# Patient Record
Sex: FEMALE | Race: WHITE | Employment: FULL TIME | URBAN - METROPOLITAN AREA
[De-identification: names, ages, dates, MRNs, and addresses within clinical notes are randomized per-mention and may not be internally consistent; named-entity substitution may affect disease eponyms.]

---

## 2019-08-23 DIAGNOSIS — F41.9 ANXIETY: ICD-10-CM

## 2019-08-23 RX ORDER — DEXTROAMPHETAMINE SACCHARATE, AMPHETAMINE ASPARTATE, DEXTROAMPHETAMINE SULFATE AND AMPHETAMINE SULFATE 2.5; 2.5; 2.5; 2.5 MG/1; MG/1; MG/1; MG/1
10 TABLET ORAL 3 TIMES DAILY
COMMUNITY
Start: 2019-08-14 | End: 2021-05-25 | Stop reason: ALTCHOICE

## 2019-08-23 RX ORDER — DEXTROAMPHETAMINE SACCHARATE, AMPHETAMINE ASPARTATE MONOHYDRATE, DEXTROAMPHETAMINE SULFATE AND AMPHETAMINE SULFATE 1.25; 1.25; 1.25; 1.25 MG/1; MG/1; MG/1; MG/1
10 CAPSULE, EXTENDED RELEASE ORAL
COMMUNITY
End: 2019-08-29

## 2019-08-23 RX ORDER — PAROXETINE HYDROCHLORIDE 20 MG/1
20 TABLET, FILM COATED ORAL EVERY MORNING
COMMUNITY
Start: 2018-12-03 | End: 2019-08-29

## 2019-08-23 RX ORDER — PAROXETINE 30 MG/1
30 TABLET, FILM COATED ORAL EVERY MORNING
COMMUNITY
End: 2020-11-19

## 2019-08-23 SDOH — HEALTH STABILITY: MENTAL HEALTH: HOW OFTEN DO YOU HAVE A DRINK CONTAINING ALCOHOL?: NEVER

## 2019-08-29 ENCOUNTER — OFFICE VISIT (OUTPATIENT)
Dept: ENDOCRINOLOGY | Age: 39
End: 2019-08-29
Payer: COMMERCIAL

## 2019-08-29 VITALS
WEIGHT: 165 LBS | BODY MASS INDEX: 28.17 KG/M2 | DIASTOLIC BLOOD PRESSURE: 95 MMHG | SYSTOLIC BLOOD PRESSURE: 128 MMHG | OXYGEN SATURATION: 99 % | HEART RATE: 84 BPM | HEIGHT: 64 IN | TEMPERATURE: 98.2 F

## 2019-08-29 DIAGNOSIS — D35.2 PITUITARY ADENOMA (HCC): ICD-10-CM

## 2019-08-29 DIAGNOSIS — R79.89 ABNORMAL THYROID BLOOD TEST: ICD-10-CM

## 2019-08-29 DIAGNOSIS — E22.1 HYPERPROLACTINEMIA (HCC): Primary | ICD-10-CM

## 2019-08-29 PROBLEM — Z86.69 HISTORY OF MIGRAINE: Status: ACTIVE | Noted: 2019-08-29

## 2019-08-29 PROBLEM — N85.8 FIBROSIS, UTERUS: Status: ACTIVE | Noted: 2019-08-20

## 2019-08-29 PROCEDURE — G8419 CALC BMI OUT NRM PARAM NOF/U: HCPCS | Performed by: INTERNAL MEDICINE

## 2019-08-29 PROCEDURE — G8427 DOCREV CUR MEDS BY ELIG CLIN: HCPCS | Performed by: INTERNAL MEDICINE

## 2019-08-29 PROCEDURE — 1036F TOBACCO NON-USER: CPT | Performed by: INTERNAL MEDICINE

## 2019-08-29 PROCEDURE — 99204 OFFICE O/P NEW MOD 45 MIN: CPT | Performed by: INTERNAL MEDICINE

## 2019-08-29 NOTE — PROGRESS NOTES
Patient ID:   Anatoly Walls is a 46 y.o. female    Chief Complaint:   Anatoly Walls is seen for initial evaluation of elevated prolactin levels. Subjective:     MRI Aug 2019: 3 mm nonenhancing nodular focus within the right half of the pituitary gland with associated focal contour abnormality, likely representing the clinically suspected prolactinoma. Last pregnancy     Prolactin 23.6, Aug 2019. She has breast discharge since giving birth in 2016. She has PMS symptoms of headaches. Mentsrual cycles are regular. S/p tubal ligation.    Denies Breast exam/Mammogram/ Nipple piercing     Denies drugs (heroine)   Denies family history of thyroid, parathyroid, pituitary, pancreatic tumors     The following portions of the patient's history were reviewed and updated as appropriate:        Family History   Problem Relation Age of Onset    Heart Failure Father          Social History     Socioeconomic History    Marital status:      Spouse name: Not on file    Number of children: Not on file    Years of education: Not on file    Highest education level: Not on file   Occupational History    Not on file   Social Needs    Financial resource strain: Not on file    Food insecurity:     Worry: Not on file     Inability: Not on file    Transportation needs:     Medical: Not on file     Non-medical: Not on file   Tobacco Use    Smoking status: Never Smoker    Smokeless tobacco: Never Used   Substance and Sexual Activity    Alcohol use: Never     Frequency: Never    Drug use: Never    Sexual activity: Not on file   Lifestyle    Physical activity:     Days per week: Not on file     Minutes per session: Not on file    Stress: Not on file   Relationships    Social connections:     Talks on phone: Not on file     Gets together: Not on file     Attends Hinduism service: Not on file     Active member of club or organization: Not on file     Attends meetings of clubs or organizations: Not on file Relationship status: Not on file    Intimate partner violence:     Fear of current or ex partner: Not on file     Emotionally abused: Not on file     Physically abused: Not on file     Forced sexual activity: Not on file   Other Topics Concern    Not on file   Social History Narrative    Not on file         Past Medical History:   Diagnosis Date    Anxiety     Migraines          Past Surgical History:   Procedure Laterality Date    APPENDECTOMY      TUBAL LIGATION     Past Surgical History:   Procedure Laterality Date    APPENDECTOMY      TUBAL LIGATION        Allergen Reactions    Penicillins Other (See Comments)     Other reaction(s): Headache  Has only taken in one time and got a headache           Current Outpatient Medications:     amphetamine-dextroamphetamine (ADDERALL) 10 MG tablet, Take 10 mg by mouth 3 times daily. , Disp: , Rfl:     Erenumab-aooe (AIMOVIG) 140 MG/ML SOAJ, Inject 140 mg into the skin, Disp: , Rfl:     PARoxetine (PAXIL) 30 MG tablet, Take 30 mg by mouth every morning , Disp: , Rfl:       Review of Systems:  Constitutional: Negative for fever, chills, and unexpected weight change. HENT: Negative for congestion, ear pain, rhinorrhea,  sore throat and trouble swallowing. Eyes: Negative for photophobia, redness, itching. Respiratory: Negative for cough, shortness of breath and sputum. Cardiovascular: Negative for chest pain, palpitations and leg swelling. Gastrointestinal: Negative for nausea, vomiting, abdominal pain, diarrhea, constipation. Endocrine: Negative for cold intolerance, heat intolerance, polydipsia, polyphagia and polyuria. Genitourinary: Negative for dysuria, urgency, frequency, hematuria and flank pain. Musculoskeletal: Negative for myalgias, back pain, arthralgias and neck pain. Skin/Nail: Negative for rash, itching. Normal nails. Neurological: Negative for seizures, weakness, light-headedness, numbness and headaches.    Hematological/ Lymph

## 2019-08-29 NOTE — PATIENT INSTRUCTIONS
Patient Education        Learning About a Prolactinoma  What is a prolactinoma? A prolactinoma is a tumor on the pituitary gland that makes too much of the hormone prolactin. This type of tumor is benign, which means it's not cancer. The pituitary (say \"xzv-BHM-ph-tair-ee\") gland--at the base of the brain--makes prolactin. After a woman is pregnant, this hormone causes the breasts to make milk. But a prolactinoma also makes prolactin. This means that your body can have too much of the hormone. It's not normal for women who aren't pregnant or nursing to have a high level of prolactin. For both men and women, too much of this hormone can make the breasts produce milk. It also can cause low sex drive and infertility. What are the symptoms? You might not have any symptoms from a prolactinoma. But in some men and women, their breasts may produce milk. In women, an increase in prolactin can lower the level of estrogen. That can cause infertility, menstrual changes, and less desire to have sex. In men, it can lower the level of testosterone. That can cause erection problems and less desire to have sex. The tumor may cause a headache. And sometimes the tumor presses on the optic nerves, which are near the pituitary gland. This might cause vision problems. How is it diagnosed? A blood test will show if you have too much prolactin in your blood. Your doctor also may do an MRI test. It can show if you have the tumor and how big it is. How is it treated? Sometimes, no treatment is needed. If you have symptoms, your doctor may treat you with dopamine agonists. This medicine can shrink the tumor. It also may bring the level of prolactin back to normal. You may need to take this medicine for 2 years or more. During and after treatment, you will get routine tests to check your hormone levels. In some cases, surgery is done to remove the tumor. This may happen if you can't take the medicine or it doesn't work.

## 2019-08-30 ENCOUNTER — HOSPITAL ENCOUNTER (OUTPATIENT)
Age: 39
Discharge: HOME | End: 2019-08-30
Payer: COMMERCIAL

## 2019-08-30 DIAGNOSIS — R79.89 ABNORMAL THYROID BLOOD TEST: ICD-10-CM

## 2019-08-30 DIAGNOSIS — D35.2 PITUITARY ADENOMA (HCC): ICD-10-CM

## 2019-08-30 DIAGNOSIS — E22.1 HYPERPROLACTINEMIA (HCC): ICD-10-CM

## 2019-08-30 LAB
A/G RATIO: 1.4 (ref 1.1–2.2)
ALBUMIN SERPL-MCNC: 4.2 G/DL (ref 3.4–5)
ALP BLD-CCNC: 58 U/L (ref 40–129)
ALT SERPL-CCNC: 9 U/L (ref 10–40)
ANION GAP SERPL CALCULATED.3IONS-SCNC: 12 MMOL/L (ref 3–16)
AST SERPL-CCNC: 17 U/L (ref 15–37)
BILIRUB SERPL-MCNC: 0.4 MG/DL (ref 0–1)
BUN BLDV-MCNC: 12 MG/DL (ref 7–20)
CALCIUM SERPL-MCNC: 9.3 MG/DL (ref 8.3–10.6)
CHLORIDE BLD-SCNC: 105 MMOL/L (ref 99–110)
CO2: 24 MMOL/L (ref 21–32)
CREAT SERPL-MCNC: 0.8 MG/DL (ref 0.6–1.1)
FOLLICLE STIMULATING HORMONE: 15.4 MIU/ML
GFR AFRICAN AMERICAN: >60
GFR NON-AFRICAN AMERICAN: >60
GLOBULIN: 2.9 G/DL
GLUCOSE BLD-MCNC: 94 MG/DL (ref 70–99)
LUTEINIZING HORMONE: 7.1 MIU/ML
POTASSIUM SERPL-SCNC: 4.5 MMOL/L (ref 3.5–5.1)
PROLACTIN: 18.5 NG/ML
SODIUM BLD-SCNC: 141 MMOL/L (ref 136–145)
T3 FREE: 2.6 PG/ML (ref 2.3–4.2)
T4 FREE: 0.8 NG/DL (ref 0.9–1.8)
TOTAL PROTEIN: 7.1 G/DL (ref 6.4–8.2)
TSH SERPL DL<=0.05 MIU/L-ACNC: 1.63 UIU/ML (ref 0.27–4.2)

## 2019-08-30 PROCEDURE — 84439 ASSAY OF FREE THYROXINE: CPT

## 2019-08-30 PROCEDURE — 84305 ASSAY OF SOMATOMEDIN: CPT

## 2019-08-30 PROCEDURE — 83002 ASSAY OF GONADOTROPIN (LH): CPT

## 2019-08-30 PROCEDURE — 84146 ASSAY OF PROLACTIN: CPT

## 2019-08-30 PROCEDURE — 83001 ASSAY OF GONADOTROPIN (FSH): CPT

## 2019-08-30 PROCEDURE — 36415 COLL VENOUS BLD VENIPUNCTURE: CPT

## 2019-08-30 PROCEDURE — 84481 FREE ASSAY (FT-3): CPT

## 2019-08-30 PROCEDURE — 84443 ASSAY THYROID STIM HORMONE: CPT

## 2019-08-30 PROCEDURE — 80053 COMPREHEN METABOLIC PANEL: CPT

## 2019-08-31 LAB — MISCELLANEOUS LAB TEST ORDER: NORMAL

## 2019-09-01 ENCOUNTER — HOSPITAL ENCOUNTER (OUTPATIENT)
Age: 39
Discharge: HOME | End: 2019-09-01
Payer: COMMERCIAL

## 2019-09-01 PROCEDURE — 82533 TOTAL CORTISOL: CPT

## 2019-09-02 ENCOUNTER — HOSPITAL ENCOUNTER (OUTPATIENT)
Age: 39
Discharge: HOME | End: 2019-09-02
Payer: COMMERCIAL

## 2019-09-02 LAB
IGF-1 (INSULIN-LIKE GROWTH I): 166 NG/ML (ref 79–276)
INSULIN-LIKE GROWTH FACTOR-1 Z-SCORE: 0.3

## 2019-09-02 PROCEDURE — 82533 TOTAL CORTISOL: CPT

## 2019-09-04 ENCOUNTER — HOSPITAL ENCOUNTER (OUTPATIENT)
Age: 39
Discharge: HOME | End: 2019-09-04
Payer: COMMERCIAL

## 2019-09-04 PROCEDURE — 82533 TOTAL CORTISOL: CPT

## 2019-09-05 ENCOUNTER — TELEPHONE (OUTPATIENT)
Dept: ENDOCRINOLOGY | Age: 39
End: 2019-09-05

## 2019-09-05 DIAGNOSIS — E03.8 CENTRAL HYPOTHYROIDISM: ICD-10-CM

## 2019-09-05 DIAGNOSIS — E22.1 HYPERPROLACTINEMIA (HCC): Primary | ICD-10-CM

## 2019-09-06 LAB
CORTISOL SALIVARY: 0.08 UG/DL
CORTISOL SALIVARY: 0.14 UG/DL
CORTISOL SALIVARY: 0.15 UG/DL

## 2019-09-09 ENCOUNTER — TELEPHONE (OUTPATIENT)
Dept: ENDOCRINOLOGY | Age: 39
End: 2019-09-09

## 2019-09-09 RX ORDER — LEVOTHYROXINE SODIUM 0.03 MG/1
25 TABLET ORAL DAILY
Qty: 90 TABLET | Refills: 0 | Status: SHIPPED | OUTPATIENT
Start: 2019-09-09 | End: 2019-11-21 | Stop reason: SDUPTHER

## 2019-11-11 ENCOUNTER — HOSPITAL ENCOUNTER (OUTPATIENT)
Age: 39
Discharge: HOME | End: 2019-11-11
Payer: COMMERCIAL

## 2019-11-11 DIAGNOSIS — E03.8 CENTRAL HYPOTHYROIDISM: ICD-10-CM

## 2019-11-11 DIAGNOSIS — E22.1 HYPERPROLACTINEMIA (HCC): ICD-10-CM

## 2019-11-11 LAB
PROLACTIN: 10.3 NG/ML
T3 FREE: 2.5 PG/ML (ref 2.3–4.2)
T4 FREE: 1 NG/DL (ref 0.9–1.8)
TSH SERPL DL<=0.05 MIU/L-ACNC: 0.86 UIU/ML (ref 0.27–4.2)

## 2019-11-11 PROCEDURE — 84146 ASSAY OF PROLACTIN: CPT

## 2019-11-11 PROCEDURE — 84443 ASSAY THYROID STIM HORMONE: CPT

## 2019-11-11 PROCEDURE — 84481 FREE ASSAY (FT-3): CPT

## 2019-11-11 PROCEDURE — 36415 COLL VENOUS BLD VENIPUNCTURE: CPT

## 2019-11-11 PROCEDURE — 84439 ASSAY OF FREE THYROXINE: CPT

## 2019-11-21 ENCOUNTER — OFFICE VISIT (OUTPATIENT)
Dept: ENDOCRINOLOGY | Age: 39
End: 2019-11-21
Payer: COMMERCIAL

## 2019-11-21 VITALS
SYSTOLIC BLOOD PRESSURE: 140 MMHG | DIASTOLIC BLOOD PRESSURE: 99 MMHG | WEIGHT: 171.8 LBS | HEART RATE: 88 BPM | HEIGHT: 64 IN | BODY MASS INDEX: 29.33 KG/M2 | OXYGEN SATURATION: 99 %

## 2019-11-21 DIAGNOSIS — E03.8 CENTRAL HYPOTHYROIDISM: ICD-10-CM

## 2019-11-21 DIAGNOSIS — E22.1 HYPERPROLACTINEMIA (HCC): ICD-10-CM

## 2019-11-21 DIAGNOSIS — D35.2 PITUITARY MICROADENOMA (HCC): Primary | ICD-10-CM

## 2019-11-21 PROCEDURE — G8427 DOCREV CUR MEDS BY ELIG CLIN: HCPCS | Performed by: INTERNAL MEDICINE

## 2019-11-21 PROCEDURE — 1036F TOBACCO NON-USER: CPT | Performed by: INTERNAL MEDICINE

## 2019-11-21 PROCEDURE — G8484 FLU IMMUNIZE NO ADMIN: HCPCS | Performed by: INTERNAL MEDICINE

## 2019-11-21 PROCEDURE — 99214 OFFICE O/P EST MOD 30 MIN: CPT | Performed by: INTERNAL MEDICINE

## 2019-11-21 PROCEDURE — G8419 CALC BMI OUT NRM PARAM NOF/U: HCPCS | Performed by: INTERNAL MEDICINE

## 2019-11-21 RX ORDER — LEVOTHYROXINE SODIUM 0.03 MG/1
25 TABLET ORAL DAILY
Qty: 90 TABLET | Refills: 1 | Status: SHIPPED | OUTPATIENT
Start: 2019-11-21 | End: 2020-03-05 | Stop reason: SDUPTHER

## 2019-11-25 ENCOUNTER — TELEPHONE (OUTPATIENT)
Dept: ENDOCRINOLOGY | Age: 39
End: 2019-11-25

## 2019-11-25 DIAGNOSIS — E22.1 HYPERPROLACTINEMIA (HCC): Primary | ICD-10-CM

## 2019-11-27 ENCOUNTER — HOSPITAL ENCOUNTER (OUTPATIENT)
Dept: ULTRASOUND IMAGING | Age: 39
Discharge: HOME | End: 2019-11-27
Payer: COMMERCIAL

## 2019-11-27 ENCOUNTER — HOSPITAL ENCOUNTER (OUTPATIENT)
Dept: WOMENS IMAGING | Age: 39
Discharge: HOME | End: 2019-11-27
Payer: COMMERCIAL

## 2019-11-27 DIAGNOSIS — R92.8 ABNORMAL MAMMOGRAM: ICD-10-CM

## 2019-11-27 DIAGNOSIS — D35.2 PITUITARY MICROADENOMA (HCC): ICD-10-CM

## 2019-11-27 DIAGNOSIS — E22.1 HYPERPROLACTINEMIA (HCC): ICD-10-CM

## 2019-11-27 DIAGNOSIS — E03.8 CENTRAL HYPOTHYROIDISM: ICD-10-CM

## 2019-11-27 PROCEDURE — 76642 ULTRASOUND BREAST LIMITED: CPT

## 2019-11-27 PROCEDURE — G0279 TOMOSYNTHESIS, MAMMO: HCPCS

## 2020-03-02 ENCOUNTER — HOSPITAL ENCOUNTER (OUTPATIENT)
Age: 40
Discharge: HOME | End: 2020-03-02
Payer: COMMERCIAL

## 2020-03-02 LAB
A/G RATIO: 1.2 (ref 1.1–2.2)
ALBUMIN SERPL-MCNC: 3.9 G/DL (ref 3.4–5)
ALP BLD-CCNC: 61 U/L (ref 40–129)
ALT SERPL-CCNC: 10 U/L (ref 10–40)
ANION GAP SERPL CALCULATED.3IONS-SCNC: 11 MMOL/L (ref 3–16)
AST SERPL-CCNC: 17 U/L (ref 15–37)
BILIRUB SERPL-MCNC: 0.4 MG/DL (ref 0–1)
BUN BLDV-MCNC: 10 MG/DL (ref 7–20)
CALCIUM SERPL-MCNC: 9.1 MG/DL (ref 8.3–10.6)
CHLORIDE BLD-SCNC: 102 MMOL/L (ref 99–110)
CO2: 25 MMOL/L (ref 21–32)
CREAT SERPL-MCNC: 0.7 MG/DL (ref 0.6–1.1)
GFR AFRICAN AMERICAN: >60
GFR NON-AFRICAN AMERICAN: >60
GLOBULIN: 3.2 G/DL
GLUCOSE BLD-MCNC: 93 MG/DL (ref 70–99)
POTASSIUM SERPL-SCNC: 4.7 MMOL/L (ref 3.5–5.1)
PROLACTIN: 15 NG/ML
SODIUM BLD-SCNC: 138 MMOL/L (ref 136–145)
T3 FREE: 2.4 PG/ML (ref 2.3–4.2)
T4 FREE: 0.9 NG/DL (ref 0.9–1.8)
TOTAL PROTEIN: 7.1 G/DL (ref 6.4–8.2)
TSH SERPL DL<=0.05 MIU/L-ACNC: 0.97 UIU/ML (ref 0.27–4.2)

## 2020-03-02 PROCEDURE — 84443 ASSAY THYROID STIM HORMONE: CPT

## 2020-03-02 PROCEDURE — 84146 ASSAY OF PROLACTIN: CPT

## 2020-03-02 PROCEDURE — 84481 FREE ASSAY (FT-3): CPT

## 2020-03-02 PROCEDURE — 36415 COLL VENOUS BLD VENIPUNCTURE: CPT

## 2020-03-02 PROCEDURE — 84439 ASSAY OF FREE THYROXINE: CPT

## 2020-03-02 PROCEDURE — 80053 COMPREHEN METABOLIC PANEL: CPT

## 2020-03-05 ENCOUNTER — OFFICE VISIT (OUTPATIENT)
Dept: ENDOCRINOLOGY | Age: 40
End: 2020-03-05
Payer: COMMERCIAL

## 2020-03-05 VITALS
HEIGHT: 64 IN | HEART RATE: 94 BPM | SYSTOLIC BLOOD PRESSURE: 135 MMHG | WEIGHT: 174 LBS | DIASTOLIC BLOOD PRESSURE: 99 MMHG | BODY MASS INDEX: 29.71 KG/M2 | OXYGEN SATURATION: 100 %

## 2020-03-05 PROBLEM — E03.8 CENTRAL HYPOTHYROIDISM: Status: ACTIVE | Noted: 2020-03-05

## 2020-03-05 PROCEDURE — 99214 OFFICE O/P EST MOD 30 MIN: CPT | Performed by: INTERNAL MEDICINE

## 2020-03-05 PROCEDURE — 1036F TOBACCO NON-USER: CPT | Performed by: INTERNAL MEDICINE

## 2020-03-05 PROCEDURE — G8484 FLU IMMUNIZE NO ADMIN: HCPCS | Performed by: INTERNAL MEDICINE

## 2020-03-05 PROCEDURE — G8427 DOCREV CUR MEDS BY ELIG CLIN: HCPCS | Performed by: INTERNAL MEDICINE

## 2020-03-05 PROCEDURE — G8419 CALC BMI OUT NRM PARAM NOF/U: HCPCS | Performed by: INTERNAL MEDICINE

## 2020-03-05 RX ORDER — LEVOTHYROXINE SODIUM 0.05 MG/1
50 TABLET ORAL DAILY
Qty: 90 TABLET | Refills: 0 | Status: SHIPPED | OUTPATIENT
Start: 2020-03-05 | End: 2020-05-07 | Stop reason: SDUPTHER

## 2020-03-05 RX ORDER — HYDROCHLOROTHIAZIDE 12.5 MG/1
12.5 TABLET ORAL DAILY
Qty: 90 TABLET | Refills: 0 | Status: SHIPPED | OUTPATIENT
Start: 2020-03-05 | End: 2020-05-07 | Stop reason: SDUPTHER

## 2020-03-05 NOTE — PROGRESS NOTES
Patient ID: Sandi Ruiz is a 45 y.o. female    Chief Complaint: Hyperprolactinemia, pituitary microadenoma, central hypothyroidism     HPI:   Sandi Ruiz is here for an evaluation of hyperprolactinemia. MRI Aug 2019: 3 mm nonenhancing nodular focus within the right half of the pituitary gland with associated focal contour abnormality, likely representing the clinically suspected prolactinoma. Last pregnancy      Prolactin 23.6, Aug 2019. Prolactin 18, at [facility] 11 Aug 2019. Other work up was okay. Prolactin 15 Nov 2019.      She has breast discharge since giving birth in 2016. She still has breast discharge  Materna GM had breast cancer. She has PMS symptoms of headaches. Mentsrual cycles are regular. S/p tubal ligation. Denies Breast exam/Mammogram/ Nipple piercing      Denies drugs (heroine)   Denies family history of thyroid, parathyroid, pituitary, pancreatic tumors     Central hypothyroidism:    Takes Levothyroxine 25 mcg (one a day on weekdays and two weekends (Saturday and Sunday), in morning empty stomach with water and waits for 30 minutes.      Energy levels good   Weight gain of 3 lbs   No Hair or skin changes   She has temperature sensitivity    She is a little anxious   Denies palpitations, sleep issues (on sleeping aid)    Neck pain    Family history of thyroid disorder: Not known   No neck radiation      The following portions of the patient's history were reviewed and updated as appropriate:       Family History   Problem Relation Age of Onset    Heart Failure Father             Social History     Socioeconomic History    Marital status:      Spouse name: Not on file    Number of children: Not on file    Years of education: Not on file    Highest education level: Not on file   Occupational History    Not on file   Social Needs    Financial resource strain: Not on file    Food insecurity:     Worry: Not on file     Inability: Not on file    Transportation needs: Medical: Not on file     Non-medical: Not on file   Tobacco Use    Smoking status: Never Smoker    Smokeless tobacco: Never Used   Substance and Sexual Activity    Alcohol use: Never     Frequency: Never    Drug use: Never    Sexual activity: Not on file   Lifestyle    Physical activity:     Days per week: Not on file     Minutes per session: Not on file    Stress: Not on file   Relationships    Social connections:     Talks on phone: Not on file     Gets together: Not on file     Attends Rastafari service: Not on file     Active member of club or organization: Not on file     Attends meetings of clubs or organizations: Not on file     Relationship status: Not on file    Intimate partner violence:     Fear of current or ex partner: Not on file     Emotionally abused: Not on file     Physically abused: Not on file     Forced sexual activity: Not on file   Other Topics Concern    Not on file   Social History Narrative    Not on file           Past Medical History:   Diagnosis Date    Anxiety     Migraines          Past Surgical History:   Procedure Laterality Date    APPENDECTOMY      TUBAL LIGATION             Allergies   Allergen Reactions    Penicillins Other (See Comments)     Other reaction(s): Headache  Has only taken in one time and got a headache             Current Outpatient Medications:     levothyroxine (SYNTHROID) 50 MCG tablet, Take 1 tablet by mouth daily, Disp: 90 tablet, Rfl: 0    hydroCHLOROthiazide (HYDRODIURIL) 12.5 MG tablet, Take 1 tablet by mouth daily, Disp: 90 tablet, Rfl: 0    amphetamine-dextroamphetamine (ADDERALL) 10 MG tablet, Take 10 mg by mouth 3 times daily. , Disp: , Rfl:     PARoxetine (PAXIL) 30 MG tablet, Take 30 mg by mouth every morning , Disp: , Rfl:       Review of Systems:  Constitutional: Negative for fever, chills. HENT: Negative for congestion, ear pain, rhinorrhea,  sore throat and trouble swallowing.    Eyes: Negative for photophobia, redness, itching. Respiratory: Negative for cough, shortness of breath and sputum. Cardiovascular: Negative for chest pain and leg swelling. Gastrointestinal: Negative for nausea, vomiting, abdominal pain, diarrhea, constipation. Endocrine: Negative for polydipsia, polyphagia and polyuria. Genitourinary: Negative for dysuria, urgency, frequency, hematuria and flank pain. Musculoskeletal: Negative for myalgias, back pain, arthralgias. Skin/Nail: Negative for rash, itching. Normal nails. Neurological: Negative for seizures, light-headedness, numbness and headaches. Hematological/ Lymph nodes: Negative for adenopathy. Does not bruise/bleed easily. Psychiatric/Behavioral: Negative for suicidal ideas and decreased concentration. Physical Exam:  BP (!) 136/95 (Site: Left Upper Arm, Position: Sitting, Cuff Size: Medium Adult)   Pulse 94   Ht 5' 4\" (1.626 m)   Wt 174 lb (78.9 kg)   LMP 02/05/2020   SpO2 100%   BMI 29.87 kg/m²   Constitutional: Patient is oriented to person, place, and time. Patient appears well-developed and well-nourished. HENT:    Head: Normocephalic and atraumatic. Eyes: Conjunctivae and EOM are normal.     Neck: Normal range of motion. Thyroid normal.   Cardiovascular: Normal rate, regular rhythm and normal heart sounds. Pulmonary/Chest: Effort normal and breath sounds normal.   Abdominal: Soft. Bowel sounds are normal.   Musculoskeletal: Normal range of motion. Neurological: Patient is alert and oriented to person, place, and time. Patient has normal reflexes. No fine tremors of hands. Skin: Skin is warm and dry. Psychiatric: Patient has a normal mood and affect.  Patient behavior is normal.     Lab Review:    Hospital Outpatient Visit on 03/02/2020   Component Date Value Ref Range Status    Prolactin 03/02/2020 15.0  ng/mL Final    T3, Free 03/02/2020 2.4  2.3 - 4.2 pg/mL Final    T4 Free 03/02/2020 0.9  0.9 - 1.8 ng/dL Final    TSH 03/02/2020 0.97  0.27 - 08/30/2019 2.6  2.3 - 4.2 pg/mL Final    Sodium 08/30/2019 141  136 - 145 mmol/L Final    Potassium 08/30/2019 4.5  3.5 - 5.1 mmol/L Final    Chloride 08/30/2019 105  99 - 110 mmol/L Final    CO2 08/30/2019 24  21 - 32 mmol/L Final    Anion Gap 08/30/2019 12  3 - 16 Final    Glucose 08/30/2019 94  70 - 99 mg/dL Final    BUN 08/30/2019 12  7 - 20 mg/dL Final    CREATININE 08/30/2019 0.8  0.6 - 1.1 mg/dL Final    GFR Non- 08/30/2019 >60  >60 Final    GFR  08/30/2019 >60  >60 Final    Calcium 08/30/2019 9.3  8.3 - 10.6 mg/dL Final    Total Protein 08/30/2019 7.1  6.4 - 8.2 g/dL Final    Alb 08/30/2019 4.2  3.4 - 5.0 g/dL Final    Albumin/Globulin Ratio 08/30/2019 1.4  1.1 - 2.2 Final    Total Bilirubin 08/30/2019 0.4  0.0 - 1.0 mg/dL Final    Alkaline Phosphatase 08/30/2019 58  40 - 129 U/L Final    ALT 08/30/2019 9* 10 - 40 U/L Final    AST 08/30/2019 17  15 - 37 U/L Final    Globulin 08/30/2019 2.9  g/dL Final    IGF-1 (INSULIN-LIKE GROWTH I) 08/30/2019 166  79 - 276 ng/mL Final    Insulin-Like GF-1 Z-Score 08/30/2019 0.3   Final    Misc Test Order 08/30/2019 SEE NOTE   Final        No results found. Assessment/Plan:     Toño Kebede was seen today for follow-up. Diagnoses and all orders for this visit:    Pituitary adenoma ([address]  -     MRI [facility]; Future    Hyperprolactinemia (HCC)  -     levothyroxine (SYNTHROID) 50 MCG tablet; Take 1 tablet by mouth daily  -     Comprehensive Metabolic Panel; Future    Central hypothyroidism  -     levothyroxine (SYNTHROID) 50 MCG tablet; Take 1 tablet by mouth daily  -     Comprehensive Metabolic Panel; Future  -     TSH without Reflex; Future  -     T4, Free; Future  -     T3, Free; Future    Essential hypertension  -     hydroCHLOROthiazide (HYDRODIURIL) 12.5 MG tablet; Take 1 tablet by mouth daily  -     Comprehensive Metabolic Panel;  Future        1: Pituitary adenoma   Hyperprolactinemia

## 2020-04-14 ENCOUNTER — TELEPHONE (OUTPATIENT)
Dept: ENDOCRINOLOGY | Age: 40
End: 2020-04-14

## 2020-04-30 ENCOUNTER — HOSPITAL ENCOUNTER (OUTPATIENT)
Age: 40
Discharge: HOME | End: 2020-04-30
Payer: COMMERCIAL

## 2020-04-30 LAB
A/G RATIO: 1.3 (ref 1.1–2.2)
ALBUMIN SERPL-MCNC: 4.3 G/DL (ref 3.4–5)
ALP BLD-CCNC: 59 U/L (ref 40–129)
ALT SERPL-CCNC: 14 U/L (ref 10–40)
ANION GAP SERPL CALCULATED.3IONS-SCNC: 12 MMOL/L (ref 3–16)
AST SERPL-CCNC: 21 U/L (ref 15–37)
BILIRUB SERPL-MCNC: 0.7 MG/DL (ref 0–1)
BUN BLDV-MCNC: 10 MG/DL (ref 7–20)
CALCIUM SERPL-MCNC: 9.5 MG/DL (ref 8.3–10.6)
CHLORIDE BLD-SCNC: 101 MMOL/L (ref 99–110)
CO2: 27 MMOL/L (ref 21–32)
CREAT SERPL-MCNC: 0.7 MG/DL (ref 0.6–1.1)
GFR AFRICAN AMERICAN: >60
GFR NON-AFRICAN AMERICAN: >60
GLOBULIN: 3.2 G/DL
GLUCOSE BLD-MCNC: 86 MG/DL (ref 70–99)
POTASSIUM SERPL-SCNC: 4.7 MMOL/L (ref 3.5–5.1)
SODIUM BLD-SCNC: 140 MMOL/L (ref 136–145)
T3 FREE: 2.6 PG/ML (ref 2.3–4.2)
T4 FREE: 1.1 NG/DL (ref 0.9–1.8)
TOTAL PROTEIN: 7.5 G/DL (ref 6.4–8.2)
TSH SERPL DL<=0.05 MIU/L-ACNC: 0.62 UIU/ML (ref 0.27–4.2)

## 2020-04-30 PROCEDURE — 84439 ASSAY OF FREE THYROXINE: CPT

## 2020-04-30 PROCEDURE — 36415 COLL VENOUS BLD VENIPUNCTURE: CPT

## 2020-04-30 PROCEDURE — 84443 ASSAY THYROID STIM HORMONE: CPT

## 2020-04-30 PROCEDURE — 84481 FREE ASSAY (FT-3): CPT

## 2020-04-30 PROCEDURE — 80053 COMPREHEN METABOLIC PANEL: CPT

## 2020-05-07 ENCOUNTER — VIRTUAL VISIT (OUTPATIENT)
Dept: ENDOCRINOLOGY | Age: 40
End: 2020-05-07
Payer: COMMERCIAL

## 2020-05-07 PROCEDURE — 99214 OFFICE O/P EST MOD 30 MIN: CPT | Performed by: INTERNAL MEDICINE

## 2020-05-07 PROCEDURE — G8427 DOCREV CUR MEDS BY ELIG CLIN: HCPCS | Performed by: INTERNAL MEDICINE

## 2020-05-07 RX ORDER — LEVOTHYROXINE SODIUM 0.05 MG/1
50 TABLET ORAL DAILY
Qty: 90 TABLET | Refills: 1 | Status: SHIPPED | OUTPATIENT
Start: 2020-05-07 | End: 2020-11-19 | Stop reason: SDUPTHER

## 2020-05-07 RX ORDER — HYDROCHLOROTHIAZIDE 12.5 MG/1
12.5 TABLET ORAL DAILY
Qty: 90 TABLET | Refills: 1 | Status: SHIPPED | OUTPATIENT
Start: 2020-05-07 | End: 2020-11-19 | Stop reason: SDUPTHER

## 2020-05-07 NOTE — PROGRESS NOTES
Patient ID: Shani Galvan is a 45 y.o. female    Chief Complaint: Hyperprolactinemia, pituitary microadenoma, central hypothyroidism     Pursuant to the emergency declaration under the [facility], [facility] waiver authority and the [facility] Act this visit was conducted after obtaining verbal consent, with the use of [employer] interactive audio and video telecommunications system that permits real time communication between the patient and provider to substitute for an in-person clinic visit to reduce the patient's risk of exposure to COVID-19 and provide necessary medical care. Because this was a Telehealth visit, evaluation of the following organ systems was limited: Vitals, Constitutional, EENT, Resp, CV, GI, , MS, Neuro, Skin. Heme. Lymph, Imm. Shani Galvan was at home. Provider was at home. No one else was involved. The patient has also been advised to contact this office for worsening conditions or problems, and seek emergency medical treatment and/or call 911 if deemed necessary. HPI:   Shani Galvan is here for an evaluation of hyperprolactinemia. MRI Aug 2019: 3 mm nonenhancing nodular focus within the right half of the pituitary gland with associated focal contour abnormality, likely representing the clinically suspected prolactinoma. Last pregnancy      Prolactin 23.6, Aug 2019. Prolactin 18, at [facility] 11 Aug 2019. Other work up was okay. Prolactin 15 Nov 2019.      She has breast discharge since giving birth in 2016. She still has breast discharge  Materna GM had breast cancer. She has PMS symptoms of headaches. Mentsrual cycles are regular. S/p tubal ligation.    Denies Breast exam/Mammogram/ Nipple piercing      Denies drugs (heroine)   Denies family history of thyroid, parathyroid, pituitary, pancreatic tumors     Central hypothyroidism:    Takes Levothyroxine 50 mcg daily in morning place, and time. Psychiatric: Patient has a normal mood and affect.  Patient behavior is normal.     Lab Review:    Hospital Outpatient Visit on 04/30/2020   Component Date Value Ref Range Status    Sodium 04/30/2020 140  136 - 145 mmol/L Final    Potassium 04/30/2020 4.7  3.5 - 5.1 mmol/L Final    Chloride 04/30/2020 101  99 - 110 mmol/L Final    CO2 04/30/2020 27  21 - 32 mmol/L Final    Anion Gap 04/30/2020 12  3 - 16 Final    Glucose 04/30/2020 86  70 - 99 mg/dL Final    BUN 04/30/2020 10  7 - 20 mg/dL Final    CREATININE 04/30/2020 0.7  0.6 - 1.1 mg/dL Final    GFR Non- 04/30/2020 >60  >60 Final    GFR  04/30/2020 >60  >60 Final    Calcium 04/30/2020 9.5  8.3 - 10.6 mg/dL Final    Total Protein 04/30/2020 7.5  6.4 - 8.2 g/dL Final    Alb 04/30/2020 4.3  3.4 - 5.0 g/dL Final    Albumin/Globulin Ratio 04/30/2020 1.3  1.1 - 2.2 Final    Total Bilirubin 04/30/2020 0.7  0.0 - 1.0 mg/dL Final    Alkaline Phosphatase 04/30/2020 59  40 - 129 U/L Final    ALT 04/30/2020 14  10 - 40 U/L Final    AST 04/30/2020 21  15 - 37 U/L Final    Globulin 04/30/2020 3.2  g/dL Final    TSH 04/30/2020 0.62  0.27 - 4.20 uIU/mL Final    T4 Free 04/30/2020 1.1  0.9 - 1.8 ng/dL Final    T3, Free 04/30/2020 2.6  2.3 - 4.2 pg/mL Final   Hospital Outpatient Visit on 03/02/2020   Component Date Value Ref Range Status    Prolactin 03/02/2020 15.0  ng/mL Final    T3, Free 03/02/2020 2.4  2.3 - 4.2 pg/mL Final    T4 Free 03/02/2020 0.9  0.9 - 1.8 ng/dL Final    TSH 03/02/2020 0.97  0.27 - 4.20 uIU/mL Final    Sodium 03/02/2020 138  136 - 145 mmol/L Final    Potassium 03/02/2020 4.7  3.5 - 5.1 mmol/L Final    Chloride 03/02/2020 102  99 - 110 mmol/L Final    CO2 03/02/2020 25  21 - 32 mmol/L Final    Anion Gap 03/02/2020 11  3 - 16 Final    Glucose 03/02/2020 93  70 - 99 mg/dL Final    BUN 03/02/2020 10  7 - 20 mg/dL Final    CREATININE 03/02/2020 0.7  0.6 - 1.1 mg/dL Final    GFR Non- 03/02/2020 >60  >60 Final    GFR  03/02/2020 >60  >60 Final    Calcium 03/02/2020 9.1  8.3 - 10.6 mg/dL Final    Total Protein 03/02/2020 7.1  6.4 - 8.2 g/dL Final    Alb 03/02/2020 3.9  3.4 - 5.0 g/dL Final    Albumin/Globulin Ratio 03/02/2020 1.2  1.1 - 2.2 Final    Total Bilirubin 03/02/2020 0.4  0.0 - 1.0 mg/dL Final    Alkaline Phosphatase 03/02/2020 61  40 - 129 U/L Final    ALT 03/02/2020 10  10 - 40 U/L Final    AST 03/02/2020 17  15 - 37 U/L Final    Globulin 03/02/2020 3.2  g/dL Final   Hospital Outpatient Visit on 11/11/2019   Component Date Value Ref Range Status    Prolactin 11/11/2019 10.3  ng/mL Final    T3, Free 11/11/2019 2.5  2.3 - 4.2 pg/mL Final    T4 Free 11/11/2019 1.0  0.9 - 1.8 ng/dL Final    TSH 11/11/2019 0.86  0.27 - 4.20 uIU/mL Final   Hospital Outpatient Visit on 09/04/2019   Component Date Value Ref Range Status    Cortisol, Saliva 09/04/2019 0.145  ug/dL Final   Hospital Outpatient Visit on 09/02/2019   Component Date Value Ref Range Status    Cortisol, Saliva 09/02/2019 0.081  ug/dL Final   Hospital Outpatient Visit on 09/01/2019   Component Date Value Ref Range Status    Cortisol, Saliva 09/01/2019 0.151  ug/dL Final   Hospital Outpatient Visit on 08/30/2019   Component Date Value Ref Range Status    Prolactin 08/30/2019 18.5  ng/mL Final    FSH 08/30/2019 15.4  mIU/mL Final    LH 08/30/2019 7.1  mIU/mL Final    TSH 08/30/2019 1.63  0.27 - 4.20 uIU/mL Final    T4 Free 08/30/2019 0.8* 0.9 - 1.8 ng/dL Final    T3, Free 08/30/2019 2.6  2.3 - 4.2 pg/mL Final    Sodium 08/30/2019 141  136 - 145 mmol/L Final    Potassium 08/30/2019 4.5  3.5 - 5.1 mmol/L Final    Chloride 08/30/2019 105  99 - 110 mmol/L Final    CO2 08/30/2019 24  21 - 32 mmol/L Final    Anion Gap 08/30/2019 12  3 - 16 Final    Glucose 08/30/2019 94  70 - 99 mg/dL Final    BUN 08/30/2019 12  7 - 20 mg/dL Final    CREATININE 08/30/2019 0.8  0.6 - 1.1 mg/dL Final    GFR Non- 08/30/2019 >60  >60 Final    GFR  08/30/2019 >60  >60 Final    Calcium 08/30/2019 9.3  8.3 - 10.6 mg/dL Final    Total Protein 08/30/2019 7.1  6.4 - 8.2 g/dL Final    Alb 08/30/2019 4.2  3.4 - 5.0 g/dL Final    Albumin/Globulin Ratio 08/30/2019 1.4  1.1 - 2.2 Final    Total Bilirubin 08/30/2019 0.4  0.0 - 1.0 mg/dL Final    Alkaline Phosphatase 08/30/2019 58  40 - 129 U/L Final    ALT 08/30/2019 9* 10 - 40 U/L Final    AST 08/30/2019 17  15 - 37 U/L Final    Globulin 08/30/2019 2.9  g/dL Final    IGF-1 (INSULIN-LIKE GROWTH I) 08/30/2019 166  79 - 276 ng/mL Final    Insulin-Like GF-1 Z-Score 08/30/2019 0.3   Final    Misc Test Order 08/30/2019 SEE NOTE   Final        No results found. Assessment/Plan:     Diagnoses and all orders for this visit:    Pituitary adenoma [address] [address]    Hyperprolactinemia ([facility] [address]  -     levothyroxine (SYNTHROID) 50 MCG tablet; Take 1 tablet by mouth daily    Central hypothyroidism  -     levothyroxine (SYNTHROID) 50 MCG tablet; Take 1 tablet by mouth daily  -     TSH without Reflex; Future  -     T4, Free; Future  -     T3, Free; Future    Essential hypertension  -     hydrochlorothiazide (HYDRODIURIL) 12.5 MG tablet; Take 1 tablet by mouth daily        1: Pituitary adenoma   Hyperprolactinemia      Prolactin - Nov 2019   Prolactin - 15 March 2020     Secondary hormonal work up normal - aug 2019     MRI brain: April 2020   1. The previous hypoenhancing abnormality in the right posterior pituitary gland is no longer clearly visualized. No suspicious pituitary abnormality identified. 2. Minimal punctate nonspecific white matter signal abnormality is most consistent with minimal small vessel ischemic disease or minimal age related degenerative change.  No change in comparison to previous exam.  3. No acute intracranial abnormality.      No need to repeat MRI unless clinically indicated     2: Galactorrhea   Fam h/o breast cancer

## 2020-05-07 NOTE — PATIENT INSTRUCTIONS
Patient Education        DASH Diet: Care Instructions  Your Care Instructions    The DASH diet is an eating plan that can help lower your blood pressure. DASH stands for Dietary Approaches to Stop Hypertension. Hypertension is high blood pressure. The DASH diet focuses on eating foods that are high in calcium, potassium, and magnesium. These nutrients can lower blood pressure. The foods that are highest in these nutrients are fruits, vegetables, low-fat dairy products, nuts, seeds, and legumes. But taking calcium, potassium, and magnesium supplements instead of eating foods that are high in those nutrients does not have the same effect. The DASH diet also includes whole grains, fish, and poultry. The DASH diet is one of several lifestyle changes your doctor may recommend to lower your high blood pressure. Your doctor may also want you to decrease the amount of sodium in your diet. Lowering sodium while following the DASH diet can lower blood pressure even further than just the DASH diet alone. Follow-up care is a key part of your treatment and safety. Be sure to make and go to all appointments, and call your doctor if you are having problems. It's also a good idea to know your test results and keep a list of the medicines you take. How can you care for yourself at home? Following the DASH diet  · Eat 4 to 5 servings of fruit each day. A serving is 1 medium-sized piece of fruit, ½ cup chopped or canned fruit, 1/4 cup dried fruit, or 4 ounces (½ cup) of fruit juice. Choose fruit more often than fruit juice. · Eat 4 to 5 servings of vegetables each day. A serving is 1 cup of lettuce or raw leafy vegetables, ½ cup of chopped or cooked vegetables, or 4 ounces (½ cup) of vegetable juice. Choose vegetables more often than vegetable juice. · Get 2 to 3 servings of low-fat and fat-free dairy each day. A serving is 8 ounces of milk, 1 cup of yogurt, or 1 ½ ounces of cheese. · Eat 6 to 8 servings of grains each day. meals using beans and peas. Add garbanzo or kidney beans to salads. Make burritos and tacos with mashed del angel beans or black beans. Where can you learn more? Go to https://vin.[email or web address] org and sign in to your [employer]cel[employer]ie account. Enter N951 in the Pixel Velocity box to learn more about \"DASH Diet: Care Instructions. \"     If you do not have an account, please click on the \"Sign Up Now\" link. Current as of: December 15, 2019Content Version: 12.4  © 5068-7475 Healthwise, [employer]. Care instructions adapted under license by [facility]. If you have questions about a medical condition or this instruction, always ask your healthcare professional. [address] any warranty or liability for your use of this information. Patient Education        DASH Diet: Care Instructions  Your Care Instructions    The DASH diet is an eating plan that can help lower your blood pressure. DASH stands for Dietary Approaches to Stop Hypertension. Hypertension is high blood pressure. The DASH diet focuses on eating foods that are high in calcium, potassium, and magnesium. These nutrients can lower blood pressure. The foods that are highest in these nutrients are fruits, vegetables, low-fat dairy products, nuts, seeds, and legumes. But taking calcium, potassium, and magnesium supplements instead of eating foods that are high in those nutrients does not have the same effect. The DASH diet also includes whole grains, fish, and poultry. The DASH diet is one of several lifestyle changes your doctor may recommend to lower your high blood pressure. Your doctor may also want you to decrease the amount of sodium in your diet. Lowering sodium while following the DASH diet can lower blood pressure even further than just the DASH diet alone. Follow-up care is a key part of your treatment and safety. Be sure to make and go to all appointments, and call your doctor if you are having problems. It's also a good idea to know your test results and keep a list of the medicines you take. How can you care for yourself at home? Following the DASH diet  · Eat 4 to 5 servings of fruit each day. A serving is 1 medium-sized piece of fruit, ½ cup chopped or canned fruit, 1/4 cup dried fruit, or 4 ounces (½ cup) of fruit juice. Choose fruit more often than fruit juice. · Eat 4 to 5 servings of vegetables each day. A serving is 1 cup of lettuce or raw leafy vegetables, ½ cup of chopped or cooked vegetables, or 4 ounces (½ cup) of vegetable juice. Choose vegetables more often than vegetable juice. · Get 2 to 3 servings of low-fat and fat-free dairy each day. A serving is 8 ounces of milk, 1 cup of yogurt, or 1 ½ ounces of cheese. · Eat 6 to 8 servings of grains each day. A serving is 1 slice of bread, 1 ounce of dry cereal, or ½ cup of cooked rice, pasta, or cooked cereal. Try to choose whole-grain products as much as possible. · Limit lean meat, poultry, and fish to 2 servings each day. A serving is 3 ounces, about the size of a deck of cards. · Eat 4 to 5 servings of nuts, seeds, and legumes (cooked dried beans, lentils, and split peas) each week. A serving is 1/3 cup of nuts, 2 tablespoons of seeds, or ½ cup of cooked beans or peas. · Limit fats and oils to 2 to 3 servings each day. A serving is 1 teaspoon of vegetable oil or 2 tablespoons of salad dressing. · Limit sweets and added sugars to 5 servings or less a week. A serving is 1 tablespoon jelly or jam, ½ cup sorbet, or 1 cup of lemonade. · Eat less than 2,300 milligrams (mg) of sodium a day. If you limit your sodium to 1,500 mg a day, you can lower your blood pressure even more. Tips for success  · Start small. Do not try to make dramatic changes to your diet all at once. You might feel that you are missing out on your favorite foods and then be more likely to not follow the plan. Make small changes, and stick with them.  Once those changes become habit, add a few more changes. · Try some of the following:  ? Make it a goal to eat a fruit or vegetable at every meal and at snacks. This will make it easy to get the recommended amount of fruits and vegetables each day. ? Try yogurt topped with fruit and nuts for a snack or healthy dessert. ? Add lettuce, tomato, cucumber, and onion to sandwiches. ? Combine a ready-made pizza crust with low-fat mozzarella cheese and lots of vegetable toppings. Try using tomatoes, squash, spinach, broccoli, carrots, cauliflower, and onions. ? Have a variety of cut-up vegetables with a low-fat dip as an appetizer instead of chips and dip. ? Sprinkle sunflower seeds or chopped almonds over salads. Or try adding chopped walnuts or almonds to cooked vegetables. ? Try some vegetarian meals using beans and peas. Add garbanzo or kidney beans to salads. Make burritos and tacos with mashed del angel beans or black beans. Where can you learn more? Go to https://[email or web address].[email or web address] org and sign in to your iStreamPlanet account. Enter Z355 in the Delphix box to learn more about \"DASH Diet: Care Instructions. \"     If you do not have an account, please click on the \"Sign Up Now\" link. Current as of: December 15, 2019Content Version: 12.4  © 9653-0819 Healthwise, Incorporated. Care instructions adapted under license by [facility] ([facility]). If you have questions about a medical condition or this instruction, always ask your healthcare professional. [place] [address] any warranty or liability for your use of this information.

## 2020-11-17 PROBLEM — I10 ESSENTIAL HYPERTENSION: Status: ACTIVE | Noted: 2020-11-17

## 2020-11-19 ENCOUNTER — VIRTUAL VISIT (OUTPATIENT)
Dept: ENDOCRINOLOGY | Age: 40
End: 2020-11-19
Payer: COMMERCIAL

## 2020-11-19 PROCEDURE — G8427 DOCREV CUR MEDS BY ELIG CLIN: HCPCS | Performed by: INTERNAL MEDICINE

## 2020-11-19 PROCEDURE — 99214 OFFICE O/P EST MOD 30 MIN: CPT | Performed by: INTERNAL MEDICINE

## 2020-11-19 RX ORDER — HYDROCHLOROTHIAZIDE 25 MG/1
25 TABLET ORAL DAILY
Qty: 90 TABLET | Refills: 2 | Status: SHIPPED | OUTPATIENT
Start: 2020-11-19 | End: 2021-05-25

## 2020-11-19 RX ORDER — LEVOTHYROXINE SODIUM 0.05 MG/1
50 TABLET ORAL DAILY
Qty: 90 TABLET | Refills: 1 | Status: SHIPPED | OUTPATIENT
Start: 2020-11-19 | End: 2021-05-25 | Stop reason: SDUPTHER

## 2020-11-19 RX ORDER — PAROXETINE HYDROCHLORIDE 40 MG/1
TABLET, FILM COATED ORAL
COMMUNITY
Start: 2020-10-29 | End: 2021-08-30

## 2020-11-19 NOTE — PROGRESS NOTES
Patient ID: Sona Lagos is a 45 y.o. female    Chief Complaint: Hyperprolactinemia, pituitary microadenoma, central hypothyroidism     Pursuant to the emergency declaration under the [facility] waiver authority and the [facility] Appropriations Act this visit was conducted after obtaining verbal consent, with the use of HipLogic interactive audio and video telecommunications system that permits real time communication between the patient and provider to substitute for an in-person clinic visit to reduce the patient's risk of exposure to COVID-19 and provide necessary medical care. Because this was a Telehealth visit, evaluation of the following organ systems was limited: Vitals, Constitutional, EENT, Resp, CV, GI, , MS, Neuro, Skin. Heme. Lymph, Imm. Sona Lagos was at home. Provider was at [facility]. No one else was involved. The patient has also been advised to contact this office for worsening conditions or problems, and seek emergency medical treatment and/or call 911 if deemed necessary. HPI:   Sona Lagos is here for an evaluation of hyperprolactinemia. MRI Aug 2019: 3 mm nonenhancing nodular focus within the right half of the pituitary gland with associated focal contour abnormality, likely representing the clinically suspected prolactinoma. Last pregnancy      Prolactin 23.6, Aug 2019. Prolactin 18, at [facility] 11 Aug 2019. Other work up was okay. Prolactin 15 Nov 2019.      She has breast discharge since giving birth in 2016. She still has breast discharge  Materna GM had breast cancer. She has PMS symptoms of headaches. Mentsrual cycles are regular. S/p tubal ligation.    Denies Breast exam/Mammogram/ Nipple piercing      Denies drugs (heroine)   Denies family history of thyroid, parathyroid, pituitary, pancreatic tumors     Central hypothyroidism:    Takes Levothyroxine 50 mcg daily in morning empty stomach with water and waits for 30 minutes. Energy levels good   Working out at home. Weight is stable.    No Hair or skin changes   She has temperature sensitivity    Anxiety is under control  Denies palpitations, sleep issues (on sleeping aid- melatonin some days)    No neck pain    Family history of thyroid disorder: Not known   No neck radiation      The following portions of the patient's history were reviewed and updated as appropriate:       Family History   Problem Relation Age of Onset    Heart Failure Father             Social History     Socioeconomic History    Marital status:      Spouse name: Not on file    Number of children: Not on file    Years of education: Not on file    Highest education level: Not on file   Occupational History    Not on file   Social Needs    Financial resource strain: Not on file    Food insecurity     Worry: Not on file     Inability: Not on file    Transportation needs     Medical: Not on file     Non-medical: Not on file   Tobacco Use    Smoking status: Never Smoker    Smokeless tobacco: Never Used   Substance and Sexual Activity    Alcohol use: Never     Frequency: Never    Drug use: Never    Sexual activity: Not on file   Lifestyle    Physical activity     Days per week: Not on file     Minutes per session: Not on file    Stress: Not on file   Relationships    Social connections     Talks on phone: Not on file     Gets together: Not on file     Attends Tenriism service: Not on file     Active member of club or organization: Not on file     Attends meetings of clubs or organizations: Not on file     Relationship status: Not on file    Intimate partner violence     Fear of current or ex partner: Not on file     Emotionally abused: Not on file     Physically abused: Not on file     Forced sexual activity: Not on file   Other Topics Concern    Not on file   Social History Narrative    Not on file           Past Medical History: Diagnosis Date    Anxiety     Migraines          Past Surgical History:   Procedure Laterality Date    APPENDECTOMY      TUBAL LIGATION             Allergies   Allergen Reactions    Penicillins Other (See Comments)     Other reaction(s): Headache  Has only taken in one time and got a headache             Current Outpatient Medications:     PARoxetine (PAXIL) 40 MG tablet, TAKE 1 TABLET BY MOUTH EVERY DAY, Disp: , Rfl:     levothyroxine (SYNTHROID) 50 MCG tablet, Take 1 tablet by mouth daily, Disp: 90 tablet, Rfl: 1    hydroCHLOROthiazide (HYDRODIURIL) 25 MG tablet, Take 1 tablet by mouth daily, Disp: 90 tablet, Rfl: 2    amphetamine-dextroamphetamine (ADDERALL) 10 MG tablet, Take 10 mg by mouth 3 times daily. , Disp: , Rfl:       Review of Systems:  Constitutional: Negative for fever, chills. HENT: Negative for congestion, ear pain, rhinorrhea,  sore throat and trouble swallowing. Eyes: Negative for photophobia, redness, itching. Respiratory: Negative for cough, shortness of breath and sputum. Cardiovascular: Negative for chest pain and leg swelling. Gastrointestinal: Negative for nausea, vomiting, abdominal pain, diarrhea, constipation. Endocrine: Negative for polydipsia, polyphagia and polyuria. Genitourinary: Negative for dysuria, urgency, frequency, hematuria and flank pain. Musculoskeletal: Negative for myalgias, back pain, arthralgias. Skin/Nail: Negative for rash, itching. Normal nails. Neurological: Negative for seizures, light-headedness, numbness and headaches. Hematological/ Lymph nodes: Negative for adenopathy. Does not bruise/bleed easily. Psychiatric/Behavioral: Negative for suicidal ideas and decreased concentration. Physical Exam:  There were no vitals taken for this visit. Constitutional: Patient is oriented to person, place, and time. Patient appears well-developed and well-nourished.    Pulmonary/Chest: Effort normal   Neurological: Patient is alert and mg/dL Final    GFR Non- 03/02/2020 >60  >60 Final    GFR  03/02/2020 >60  >60 Final    Calcium 03/02/2020 9.1  8.3 - 10.6 mg/dL Final    Total Protein 03/02/2020 7.1  6.4 - 8.2 g/dL Final    Alb 03/02/2020 3.9  3.4 - 5.0 g/dL Final    Albumin/Globulin Ratio 03/02/2020 1.2  1.1 - 2.2 Final    Total Bilirubin 03/02/2020 0.4  0.0 - 1.0 mg/dL Final    Alkaline Phosphatase 03/02/2020 61  40 - 129 U/L Final    ALT 03/02/2020 10  10 - 40 U/L Final    AST 03/02/2020 17  15 - 37 U/L Final    Globulin 03/02/2020 3.2  g/dL Final        No results found. Assessment/Plan:     Briana Hall was seen today for follow-up. Diagnoses and all orders for this visit:    Hyperprolactinemia ([address]  -     levothyroxine (SYNTHROID) 50 MCG tablet; Take 1 tablet by mouth daily  -     Basic Metabolic Panel; Future  -     Basic Metabolic Panel; Future  -     TSH without Reflex; Future  -     T4, Free; Future  -     T3, Free; Future    Central hypothyroidism  -     levothyroxine (SYNTHROID) 50 MCG tablet; Take 1 tablet by mouth daily  -     Basic Metabolic Panel; Future  -     Basic Metabolic Panel; Future  -     TSH without Reflex; Future  -     T4, Free; Future  -     T3, Free; Future    Essential hypertension  -     hydroCHLOROthiazide (HYDRODIURIL) 25 MG tablet; Take 1 tablet by mouth daily  -     Basic Metabolic Panel; Future  -     Basic Metabolic Panel; Future  -     TSH without Reflex; Future  -     T4, Free; Future  -     T3, Free; Future        1: Pituitary adenoma   Hyperprolactinemia      Prolactin - Nov 2019   Prolactin - 15 March 2020     Secondary hormonal work up normal - aug 2019     MRI brain: April 2020   1. The previous hypoenhancing abnormality in the right posterior pituitary gland is no longer clearly visualized. No suspicious pituitary abnormality identified.   2. Minimal punctate nonspecific white matter signal abnormality is most consistent with minimal small vessel ischemic disease or minimal age related degenerative change. No change in comparison to previous exam.  3. No acute intracranial abnormality.      No need to repeat MRI unless clinically indicated     2: Galactorrhea   Fam h/o breast cancer     Mammogram and US normal Nov 2019     3: Central hypothyroidism   History of low FT4 for quite sometime     Free levels low normal, target for mid normal     Since she is euthyroid clinically we will continue same dose     C/w Levothyroxine 50 mcg daily in am     Education: Reviewed how to properly take thyroid replacement. Instructed to take daily with water on an empty stomach without concomitant vitamins or calcium or other medicine. Wait for 30-45 minutes before eating. If patient is confident they missed a day of pills, they can take the missed dose with their next tablet (only for levothyroxine).     4: HTN  Diastolic HTN   Running 982'X/85'V , sometimes diastolic BP in 10'C   Currently HCTZ 12.5mg daily- change to 25 mg daily      She will report BP if running high >140/85     TSH, FT4, Ft3, BMP in 2-3 weeks to monitor     BMP, thyroid labs in 6 months      RTC in 6 months        Electronically signed by Jagdish Gilman MD on 11/19/2020 at 10:49 AM

## 2020-12-18 ENCOUNTER — HOSPITAL ENCOUNTER (OUTPATIENT)
Age: 40
Discharge: HOME | End: 2020-12-18
Payer: COMMERCIAL

## 2020-12-18 LAB
ANION GAP SERPL CALCULATED.3IONS-SCNC: 11 MMOL/L (ref 3–16)
BUN BLDV-MCNC: 13 MG/DL (ref 7–20)
CALCIUM SERPL-MCNC: 9.6 MG/DL (ref 8.3–10.6)
CHLORIDE BLD-SCNC: 95 MMOL/L (ref 99–110)
CO2: 30 MMOL/L (ref 21–32)
CREAT SERPL-MCNC: 0.7 MG/DL (ref 0.6–1.1)
GFR AFRICAN AMERICAN: >60
GFR NON-AFRICAN AMERICAN: >60
GLUCOSE BLD-MCNC: 87 MG/DL (ref 70–99)
POTASSIUM SERPL-SCNC: 3.9 MMOL/L (ref 3.5–5.1)
SODIUM BLD-SCNC: 136 MMOL/L (ref 136–145)
T3 FREE: 2.8 PG/ML (ref 2.3–4.2)
T4 FREE: 1.1 NG/DL (ref 0.9–1.8)
TSH SERPL DL<=0.05 MIU/L-ACNC: 0.87 UIU/ML (ref 0.27–4.2)

## 2020-12-18 PROCEDURE — 36415 COLL VENOUS BLD VENIPUNCTURE: CPT

## 2020-12-18 PROCEDURE — 84443 ASSAY THYROID STIM HORMONE: CPT

## 2020-12-18 PROCEDURE — 84481 FREE ASSAY (FT-3): CPT

## 2020-12-18 PROCEDURE — 84439 ASSAY OF FREE THYROXINE: CPT

## 2020-12-18 PROCEDURE — 80048 BASIC METABOLIC PNL TOTAL CA: CPT

## 2021-05-14 DIAGNOSIS — E22.1 HYPERPROLACTINEMIA (HCC): Primary | ICD-10-CM

## 2021-05-14 DIAGNOSIS — E03.8 CENTRAL HYPOTHYROIDISM: ICD-10-CM

## 2021-05-17 ENCOUNTER — HOSPITAL ENCOUNTER (OUTPATIENT)
Age: 41
Discharge: HOME | End: 2021-05-17
Payer: COMMERCIAL

## 2021-05-17 DIAGNOSIS — E03.8 CENTRAL HYPOTHYROIDISM: ICD-10-CM

## 2021-05-17 DIAGNOSIS — E22.1 HYPERPROLACTINEMIA (HCC): ICD-10-CM

## 2021-05-17 LAB
ANION GAP SERPL CALCULATED.3IONS-SCNC: 9 MMOL/L (ref 3–16)
BUN BLDV-MCNC: 12 MG/DL (ref 7–20)
CALCIUM SERPL-MCNC: 9.3 MG/DL (ref 8.3–10.6)
CHLORIDE BLD-SCNC: 101 MMOL/L (ref 99–110)
CO2: 28 MMOL/L (ref 21–32)
CREAT SERPL-MCNC: 0.9 MG/DL (ref 0.6–1.1)
GFR AFRICAN AMERICAN: >60
GFR NON-AFRICAN AMERICAN: >60
GLUCOSE BLD-MCNC: 76 MG/DL (ref 70–99)
POTASSIUM SERPL-SCNC: 4.4 MMOL/L (ref 3.5–5.1)
SODIUM BLD-SCNC: 138 MMOL/L (ref 136–145)
T3 FREE: 2.5 PG/ML (ref 2.3–4.2)
T4 FREE: 1.1 NG/DL (ref 0.9–1.8)
TSH SERPL DL<=0.05 MIU/L-ACNC: 0.48 UIU/ML (ref 0.27–4.2)

## 2021-05-17 PROCEDURE — 80048 BASIC METABOLIC PNL TOTAL CA: CPT

## 2021-05-17 PROCEDURE — 36415 COLL VENOUS BLD VENIPUNCTURE: CPT

## 2021-05-17 PROCEDURE — 84439 ASSAY OF FREE THYROXINE: CPT

## 2021-05-17 PROCEDURE — 84443 ASSAY THYROID STIM HORMONE: CPT

## 2021-05-17 PROCEDURE — 84481 FREE ASSAY (FT-3): CPT

## 2021-05-25 ENCOUNTER — VIRTUAL VISIT (OUTPATIENT)
Dept: ENDOCRINOLOGY | Age: 41
End: 2021-05-25
Payer: COMMERCIAL

## 2021-05-25 DIAGNOSIS — E22.1 HYPERPROLACTINEMIA (HCC): ICD-10-CM

## 2021-05-25 DIAGNOSIS — E03.8 CENTRAL HYPOTHYROIDISM: ICD-10-CM

## 2021-05-25 PROCEDURE — 99214 OFFICE O/P EST MOD 30 MIN: CPT | Performed by: INTERNAL MEDICINE

## 2021-05-25 PROCEDURE — G8427 DOCREV CUR MEDS BY ELIG CLIN: HCPCS | Performed by: INTERNAL MEDICINE

## 2021-05-25 RX ORDER — HYDROCHLOROTHIAZIDE 25 MG/1
12.5 TABLET ORAL DAILY
COMMUNITY
End: 2021-05-25 | Stop reason: SDUPTHER

## 2021-05-25 RX ORDER — LISINOPRIL 2.5 MG/1
2.5 TABLET ORAL DAILY
COMMUNITY
Start: 2021-05-14

## 2021-05-25 RX ORDER — HYDROCHLOROTHIAZIDE 12.5 MG/1
12.5 TABLET ORAL DAILY
Qty: 90 TABLET | Refills: 0 | Status: SHIPPED | OUTPATIENT
Start: 2021-05-25 | End: 2021-08-16

## 2021-05-25 RX ORDER — LEVOTHYROXINE SODIUM 0.05 MG/1
TABLET ORAL
Qty: 97 TABLET | Refills: 1 | Status: SHIPPED | OUTPATIENT
Start: 2021-05-25 | End: 2021-11-09

## 2021-05-25 NOTE — PROGRESS NOTES
Patient ID: Erinn Guerra is a 37 y.o. female    Chief Complaint: Hyperprolactinemia, pituitary microadenoma, central hypothyroidism     Pursuant to the emergency declaration under the [facility], [facility] waiver authority and the [employer] [place] this visit was conducted after obtaining verbal consent, with the use of [employer] interactive audio and video telecommunications system that permits real time communication between the patient and provider to substitute for an in-person clinic visit to reduce the patient's risk of exposure to COVID-19 and provide necessary medical care. Because this was a Telehealth visit, evaluation of the following organ systems was limited: Vitals, Constitutional, EENT, Resp, CV, GI, , MS, Neuro, Skin. Heme. Lymph, Imm. Erinn Guerra was at home. Provider was at [facility]. No one else was involved. The patient has also been advised to contact this office for worsening conditions or problems, and seek emergency medical treatment and/or call 911 if deemed necessary. HPI:   Erinn Guerra is here for an evaluation of hyperprolactinemia. MRI Aug 2019: 3 mm nonenhancing nodular focus within the right half of the pituitary gland with associated focal contour abnormality, likely representing the clinically suspected prolactinoma. Last pregnancy      Prolactin 23.6, Aug 2019. Prolactin 18, at [facility] 11 Aug 2019. Other work up was okay. Prolactin 15 Nov 2019.      She has breast discharge since giving birth in 2016. She still has breast discharge  Materna GM had breast cancer. She has PMS symptoms of headaches. Mentsrual cycles are regular. S/p tubal ligation.    Denies Breast exam/Mammogram/ Nipple piercing      Denies drugs (heroine)   Denies family history of thyroid, parathyroid, pituitary, pancreatic tumors     Interval history  Central hypothyroidism:    Takes Levothyroxine 50 mcg daily in morning empty stomach with water and waits for 30 minutes. Energy levels good   Working out at home. Weight is coming down wih work outs. Taking fitness class, 4 times per week, an hour each time. Headaches are better   No Hair or skin changes   Temperature sensitivity is better   Anxiety is under control. Taking paroxetine   Denies palpitations, sleep issues (on sleeping aid- melatonin some days)    No neck pain    Family history of thyroid disorder: Not known   No neck radiation      The following portions of the patient's history were reviewed and updated as appropriate:       Family History   Problem Relation Age of Onset    Heart Failure Father             Social History     Socioeconomic History    Marital status:      Spouse name: Not on file    Number of children: Not on file    Years of education: Not on file    Highest education level: Not on file   Occupational History    Not on file   Tobacco Use    Smoking status: Never Smoker    Smokeless tobacco: Never Used   Vaping Use    Vaping Use: Never used   Substance and Sexual Activity    Alcohol use: Never    Drug use: Never    Sexual activity: Not on file   Other Topics Concern    Not on file   Social History Narrative    Not on file     Social Determinants of Health     Financial Resource Strain:     Difficulty of Paying Living Expenses:    Food Insecurity:     Worried About Running Out of Food in the Last Year:     [address] in the Last Year:    Transportation Needs:     Lack of Transportation (Medical):      Lack of Transportation (Non-Medical):    Physical Activity:     Days of Exercise per Week:     Minutes of Exercise per Session:    Stress:     Feeling of Stress :    Social Connections:     Frequency of Communication with Friends and Family:     Frequency of Social Gatherings with Friends and Family:     Attends Anglican Services:     Active Member of Clubs or Organizations:  Attends Club or Organization Meetings:     Marital Status:    Intimate Partner Violence:     Fear of Current or Ex-Partner:     Emotionally Abused:     Physically Abused:     Sexually Abused:            Past Medical History:   Diagnosis Date    Anxiety     Migraines          Past Surgical History:   Procedure Laterality Date    APPENDECTOMY      TUBAL LIGATION             Allergies   Allergen Reactions    Penicillins Other (See Comments)     Other reaction(s): Headache  Has only taken in one time and got a headache             Current Outpatient Medications:     lisinopril (PRINIVIL;ZESTRIL) 2.5 MG tablet, Take 2.5 mg by mouth daily , Disp: , Rfl:     levothyroxine (SYNTHROID) 50 MCG tablet, One tab six days a week and two tabs on Sunday, Disp: 97 tablet, Rfl: 1    hydroCHLOROthiazide (HYDRODIURIL) 12.5 MG tablet, Take 1 tablet by mouth daily, Disp: 90 tablet, Rfl: 0    PARoxetine (PAXIL) 40 MG tablet, TAKE 1 TABLET BY MOUTH EVERY DAY, Disp: , Rfl:       Review of Systems:  Constitutional: Negative for fever, chills. HENT: Negative for congestion, ear pain, rhinorrhea,  sore throat and trouble swallowing. Eyes: Negative for photophobia, redness, itching. Respiratory: Negative for cough, shortness of breath and sputum. Cardiovascular: Negative for chest pain and leg swelling. Gastrointestinal: Negative for nausea, vomiting, abdominal pain, diarrhea, constipation. Endocrine: Negative for polydipsia, polyphagia and polyuria. Genitourinary: Negative for dysuria, urgency, frequency, hematuria and flank pain. Musculoskeletal: Negative for myalgias, back pain, arthralgias. Skin/Nail: Negative for rash, itching. Normal nails. Neurological: Negative for seizures, light-headedness, numbness and headaches. Hematological/ Lymph nodes: Negative for adenopathy. Does not bruise/bleed easily. Psychiatric/Behavioral: Negative for suicidal ideas and decreased concentration.           Physical found.        Assessment/Plan:     Tiburcio Schwab was seen today for follow-up. Diagnoses and all orders for this visit:    Hyperprolactinemia ([address]    Central hypothyroidism  -     levothyroxine (SYNTHROID) 50 MCG tablet; One tab six days a week and two tabs on Sunday  -     TSH without Reflex; Future  -     T4, Free; Future  -     T3, Free; Future  -     hydroCHLOROthiazide (HYDRODIURIL) 12.5 MG tablet; Take 1 tablet by mouth daily        1: Pituitary adenoma   Hyperprolactinemia      Prolactin - Nov 2019   Prolactin - 15 March 2020     Secondary hormonal work up normal - aug 2019     MRI brain: April 2020   1. The previous hypoenhancing abnormality in the right posterior pituitary gland is no longer clearly visualized. No suspicious pituitary abnormality identified. 2. Minimal punctate nonspecific white matter signal abnormality is most consistent with minimal small vessel ischemic disease or minimal age related degenerative change. No change in comparison to previous exam.  3. No acute intracranial abnormality.      No need to repeat MRI unless clinically indicated     2: Galactorrhea   Fam h/o breast cancer     Mammogram and US normal Nov 2019     3: Central hypothyroidism   History of low FT4 for quite sometime     Free levels low normal, target for mid normal . TSH is unreliable. Since she is euthyroid clinically we will continue same dose     Change Levothyroxine 50 mcg six days a week and two tabs on Sunday     Education: Reviewed how to properly take thyroid replacement. Instructed to take daily with water on an empty stomach without concomitant vitamins or calcium or other medicine. Wait for 30-45 minutes before eating. If patient is confident they missed a day of pills, they can take the missed dose with their next tablet (only for levothyroxine).     4: HTN  Managed by pcp   Meds changed recently     TSH, FT4, FT3 in 3 months       RTC in 3 months        Electronically signed by Bowen Vera

## 2021-08-26 ENCOUNTER — HOSPITAL ENCOUNTER (OUTPATIENT)
Age: 41
Discharge: HOME | End: 2021-08-26
Payer: COMMERCIAL

## 2021-08-26 DIAGNOSIS — E03.8 CENTRAL HYPOTHYROIDISM: ICD-10-CM

## 2021-08-26 LAB
T3 FREE: 2.1 PG/ML (ref 2.3–4.2)
T4 FREE: 1.4 NG/DL (ref 0.9–1.8)
TSH SERPL DL<=0.05 MIU/L-ACNC: 0.5 UIU/ML (ref 0.27–4.2)

## 2021-08-26 PROCEDURE — 84443 ASSAY THYROID STIM HORMONE: CPT

## 2021-08-26 PROCEDURE — 84439 ASSAY OF FREE THYROXINE: CPT

## 2021-08-26 PROCEDURE — 36415 COLL VENOUS BLD VENIPUNCTURE: CPT

## 2021-08-26 PROCEDURE — 84481 FREE ASSAY (FT-3): CPT

## 2021-08-30 ENCOUNTER — VIRTUAL VISIT (OUTPATIENT)
Dept: ENDOCRINOLOGY | Age: 41
End: 2021-08-30
Payer: COMMERCIAL

## 2021-08-30 DIAGNOSIS — E03.8 CENTRAL HYPOTHYROIDISM: Primary | ICD-10-CM

## 2021-08-30 DIAGNOSIS — E22.1 HYPERPROLACTINEMIA (HCC): ICD-10-CM

## 2021-08-30 PROCEDURE — G8427 DOCREV CUR MEDS BY ELIG CLIN: HCPCS | Performed by: INTERNAL MEDICINE

## 2021-08-30 PROCEDURE — 99214 OFFICE O/P EST MOD 30 MIN: CPT | Performed by: INTERNAL MEDICINE

## 2021-08-30 RX ORDER — PAROXETINE 30 MG/1
30 TABLET, FILM COATED ORAL EVERY MORNING
COMMUNITY
Start: 2021-08-28

## 2021-08-30 NOTE — PROGRESS NOTES
Patient ID: Makayla Quiroz is a 37 y.o. female    Chief Complaint: Hyperprolactinemia, pituitary microadenoma, central hypothyroidism     Pursuant to the emergency declaration under the [facility], [facility] waiver authority and the [facility] Appropriations Act this visit was conducted after obtaining verbal consent, with the use of [employer] interactive audio and video telecommunications system that permits real time communication between the patient and provider to substitute for an in-person clinic visit to reduce the patient's risk of exposure to COVID-19 and provide necessary medical care. Because this was a Telehealth visit, evaluation of the following organ systems was limited: Vitals, Constitutional, EENT, Resp, CV, GI, , MS, Neuro, Skin. Heme. Lymph, Imm. Makayla Quiroz was at home. Provider was at [facility]. No one else was involved. The patient has also been advised to contact this office for worsening conditions or problems, and seek emergency medical treatment and/or call 911 if deemed necessary. HPI:   Makayla Quiroz is here for an evaluation of hyperprolactinemia. MRI Aug 2019: 3 mm nonenhancing nodular focus within the right half of the pituitary gland with associated focal contour abnormality, likely representing the clinically suspected prolactinoma. Last pregnancy      Prolactin 23.6, Aug 2019. Prolactin 18, at [facility] 11 Aug 2019. Other work up was okay. Prolactin 15 Nov 2019.      She has breast discharge since giving birth in 2016. She still has breast discharge  Materna GM had breast cancer. She has PMS symptoms of headaches. Mentsrual cycles are regular. S/p tubal ligation.    Denies Breast exam/Mammogram/ Nipple piercing      Denies drugs (heroine)   Denies family history of thyroid, parathyroid, pituitary, pancreatic tumors     Interval history  Central hypothyroidism:    Takes Levothyroxine 50 mcg one tab six days a week and two on Sunday. in morning empty stomach with water and waits for 30 minutes. Energy levels good   Working out at home. Weight is stable. Working in [email or web address], 3 days a week . She has harder to catch breath. She is more hot now. Used to be cold. Headaches only with periods  No Hair or skin changes   Anxiety is under control. Taking paroxetine   Denies palpitations, sleep issues (on sleeping aid- melatonin some days)    No neck pain    Family history of thyroid disorder: Not known   No neck radiation      The following portions of the patient's history were reviewed and updated as appropriate:       Family History   Problem Relation Age of Onset    Other Mother         Dermayostosis    Heart Failure Father     Heart Disease Sister     Stroke Sister     Heart Attack Sister     Cancer Maternal Grandmother     Kidney Disease Maternal Grandfather             Social History     Socioeconomic History    Marital status:      Spouse name: Not on file    Number of children: Not on file    Years of education: Not on file    Highest education level: Not on file   Occupational History    Not on file   Tobacco Use    Smoking status: Never Smoker    Smokeless tobacco: Never Used   Vaping Use    Vaping Use: Never used   Substance and Sexual Activity    Alcohol use: Never    Drug use: Never    Sexual activity: Not on file   Other Topics Concern    Not on file   Social History Narrative    Not on file     Social Determinants of Health     Financial Resource Strain:     Difficulty of Paying Living Expenses:    Food Insecurity:     Worried About Running Out of Food in the Last Year:     [address] in the Last Year:    Transportation Needs:     Lack of Transportation (Medical):      Lack of Transportation (Non-Medical):    Physical Activity:     Days of Exercise per Week:     Minutes of Exercise per Session:    Stress:     Feeling of Stress :    Social Connections:     Frequency of Communication with Friends and Family:     Frequency of Social Gatherings with Friends and Family:     Attends Confucianist Services:     Active Member of Clubs or Organizations:     Attends Club or Organization Meetings:     Marital Status:    Intimate Partner Violence:     Fear of Current or Ex-Partner:     Emotionally Abused:     Physically Abused:     Sexually Abused:            Past Medical History:   Diagnosis Date    Anxiety     Migraines          Past Surgical History:   Procedure Laterality Date    APPENDECTOMY      TUBAL LIGATION             Allergies   Allergen Reactions    Penicillins Other (See Comments)     Other reaction(s): Headache  Has only taken in one time and got a headache             Current Outpatient Medications:     PARoxetine (PAXIL) 30 MG tablet, Take 30 mg by mouth every morning , Disp: , Rfl:     hydroCHLOROthiazide (HYDRODIURIL) 25 MG tablet, TAKE 1 TABLET BY MOUTH EVERY DAY, Disp: 30 tablet, Rfl: 8    lisinopril (PRINIVIL;ZESTRIL) 2.5 MG tablet, Take 2.5 mg by mouth daily , Disp: , Rfl:     levothyroxine (SYNTHROID) 50 MCG tablet, One tab six days a week and two tabs on Sunday, Disp: 97 tablet, Rfl: 1      Review of Systems:  Constitutional: Negative for fever, chills. HENT: Negative for congestion, ear pain, rhinorrhea,  sore throat and trouble swallowing. Eyes: Negative for photophobia, redness, itching. Respiratory: Negative for cough, shortness of breath and sputum. Cardiovascular: Negative for chest pain and leg swelling. Gastrointestinal: Negative for nausea, vomiting, abdominal pain, diarrhea, constipation. Endocrine: Negative for polydipsia, polyphagia and polyuria. Genitourinary: Negative for dysuria, urgency, frequency, hematuria and flank pain. Musculoskeletal: Negative for myalgias, back pain, arthralgias. Skin/Nail: Negative for rash, itching. Normal nails.    Neurological: Negative for seizures, light-headedness, numbness and headaches. Hematological/ Lymph nodes: Negative for adenopathy. Does not bruise/bleed easily. Psychiatric/Behavioral: Negative for suicidal ideas and decreased concentration. Physical Exam:  There were no vitals taken for this visit. Constitutional: Patient is oriented to person, place, and time. Patient appears well-developed and well-nourished. Pulmonary/Chest: Effort normal   Neurological: Patient is alert and oriented to person, place, and time. Psychiatric: Patient has a normal mood and affect.  Patient behavior is normal.     Lab Review:    Hospital Outpatient Visit on 08/26/2021   Component Date Value Ref Range Status    T3, Free 08/26/2021 2.1* 2.3 - 4.2 pg/mL Final    T4 Free 08/26/2021 1.4  0.9 - 1.8 ng/dL Final    TSH 08/26/2021 0.50  0.27 - 4.20 uIU/mL Final   Hospital Outpatient Visit on 05/17/2021   Component Date Value Ref Range Status    T3, Free 05/17/2021 2.5  2.3 - 4.2 pg/mL Final    T4 Free 05/17/2021 1.1  0.9 - 1.8 ng/dL Final    TSH 05/17/2021 0.48  0.27 - 4.20 uIU/mL Final    Sodium 05/17/2021 138  136 - 145 mmol/L Final    Potassium 05/17/2021 4.4  3.5 - 5.1 mmol/L Final    Chloride 05/17/2021 101  99 - 110 mmol/L Final    CO2 05/17/2021 28  21 - 32 mmol/L Final    Anion Gap 05/17/2021 9  3 - 16 Final    Glucose 05/17/2021 76  70 - 99 mg/dL Final    BUN 05/17/2021 12  7 - 20 mg/dL Final    CREATININE 05/17/2021 0.9  0.6 - 1.1 mg/dL Final    GFR Non- 05/17/2021 >60  >60 Final    GFR  05/17/2021 >60  >60 Final    Calcium 05/17/2021 9.3  8.3 - 10.6 mg/dL Final   Hospital Outpatient Visit on 12/18/2020   Component Date Value Ref Range Status    T3, Free 12/18/2020 2.8  2.3 - 4.2 pg/mL Final    T4 Free 12/18/2020 1.1  0.9 - 1.8 ng/dL Final    TSH 12/18/2020 0.87  0.27 - 4.20 uIU/mL Final    Sodium 12/18/2020 136  136 - 145 mmol/L Final    Potassium 12/18/2020 3.9  3.5 - 5.1 mmol/L Final  Chloride 12/18/2020 95* 99 - 110 mmol/L Final    CO2 12/18/2020 30  21 - 32 mmol/L Final    Anion Gap 12/18/2020 11  3 - 16 Final    Glucose 12/18/2020 87  70 - 99 mg/dL Final    BUN 12/18/2020 13  7 - 20 mg/dL Final    CREATININE 12/18/2020 0.7  0.6 - 1.1 mg/dL Final    GFR Non- 12/18/2020 >60  >60 Final    GFR  12/18/2020 >60  >60 Final    Calcium 12/18/2020 9.6  8.3 - 10.6 mg/dL Final        No results found. Assessment/Plan:     Briana Hall was seen today for follow-up and thyroid problem. Diagnoses and all orders for this visit:    Central hypothyroidism  -     TSH without Reflex; Future  -     T4, Free; Future  -     T3; Future  -     T3, Free; Future    Hyperprolactinemia (HCC)  -     TSH without Reflex; Future  -     T4, Free; Future  -     T3; Future  -     T3, Free; Future        1: Pituitary adenoma   Hyperprolactinemia      Prolactin - Nov 2019   Prolactin - 15 March 2020     Secondary hormonal work up normal - aug 2019     MRI brain: April 2020   1. The previous hypoenhancing abnormality in the right posterior pituitary gland is no longer clearly visualized. No suspicious pituitary abnormality identified. 2. Minimal punctate nonspecific white matter signal abnormality is most consistent with minimal small vessel ischemic disease or minimal age related degenerative change. No change in comparison to previous exam.  3. No acute intracranial abnormality.      No need to repeat MRI unless clinically indicated     2: Galactorrhea   Fam h/o breast cancer     Mammogram and US normal Nov 2019     3: Central hypothyroidism   History of low FT4 for quite sometime     Free FT4 is good now, Free 3 is low normal     Because fo her symptoms of heat sensitivity and dyspnea, will continue same dose of Levothyroxine 50 mcg six days a week and two tabs on Sunday     Education: Reviewed how to properly take thyroid replacement.  Instructed to take daily with water on an empty stomach without concomitant vitamins or calcium or other medicine. Wait for 30-45 minutes before eating. If patient is confident they missed a day of pills, they can take the missed dose with their next tablet (only for levothyroxine).     4: HTN  Managed by pcp   Meds changed recently     TSH, FT4, FT3, TT3 in 6 months       RTC in 6 months        Electronically signed by Brook Packer MD on 8/30/2021 at 10:58 AM

## 2021-09-28 DIAGNOSIS — I10 ESSENTIAL HYPERTENSION: ICD-10-CM

## 2021-09-28 RX ORDER — HYDROCHLOROTHIAZIDE 25 MG/1
TABLET ORAL
Qty: 30 TABLET | Refills: 8 | OUTPATIENT
Start: 2021-09-28

## 2021-09-28 NOTE — TELEPHONE ENCOUNTER
Requested Prescriptions     Pending Prescriptions Disp Refills    hydroCHLOROthiazide (HYDRODIURIL) 25 MG tablet [Pharmacy Med Name: HYDROCHLOROTHIAZIDE 25 MG TAB] 30 tablet 8     Sig: TAKE 1 TABLET BY MOUTH EVERY DAY       Refill requested too soon!

## 2022-02-28 ENCOUNTER — TELEMEDICINE (OUTPATIENT)
Dept: ENDOCRINOLOGY | Age: 42
End: 2022-02-28
Payer: COMMERCIAL

## 2022-02-28 DIAGNOSIS — E03.8 CENTRAL HYPOTHYROIDISM: Primary | ICD-10-CM

## 2022-02-28 PROCEDURE — G8427 DOCREV CUR MEDS BY ELIG CLIN: HCPCS | Performed by: INTERNAL MEDICINE

## 2022-02-28 PROCEDURE — 99214 OFFICE O/P EST MOD 30 MIN: CPT | Performed by: INTERNAL MEDICINE

## 2022-02-28 RX ORDER — LEVOTHYROXINE SODIUM 0.05 MG/1
TABLET ORAL
Qty: 103 TABLET | Refills: 2 | Status: SHIPPED | OUTPATIENT
Start: 2022-02-28

## 2022-02-28 RX ORDER — METHOCARBAMOL 500 MG/1
TABLET, FILM COATED ORAL
COMMUNITY
Start: 2022-02-16

## 2022-02-28 NOTE — PROGRESS NOTES
Patient ID: Kendra Liang is a 40 y.o. female    Chief Complaint: Hyperprolactinemia, pituitary microadenoma, central hypothyroidism     The patient (or guardian if applicable) is aware that this is a billable service, which includes applicable co-pays. This Virtual Visit was conducted with patient's (and/or legal guardian's) consent. The visit was conducted pursuant to the emergency declaration under the [facility] [address] and the [employer] Act. Patient identification was verified, and a caregiver was present when appropriate. Because this was a Telehealth visit, evaluation of the following organ systems was limited: Vitals, Constitutional, EENT, Resp, CV, GI, , MS, Neuro, Skin. Heme. Lymph, Imm. Kendra Liang was at home. Provider was at [address] [place] [address]. The patient has also been advised to contact this office for worsening conditions or problems, and seek emergency medical treatment and/or call 911 if deemed necessary. HPI:   Kendra Liang is here for an evaluation of hyperprolactinemia. MRI Aug 2019: 3 mm nonenhancing nodular focus within the right half of the pituitary gland with associated focal contour abnormality, likely representing the clinically suspected prolactinoma.     Prolactin 23.6, Aug 2019. Prolactin 18, at [facility] 11 Aug 2019. Other work up was okay. Prolactin 15 Nov 2019.      She has breast discharge since giving birth in 2016. She still has breast discharge  Materna GM had breast cancer. She has PMS symptoms of headaches. Mentsrual cycles are regular. S/p tubal ligation.    Denies Breast exam/Mammogram/ Nipple piercing      Denies drugs (heroine)   Denies family history of thyroid, parathyroid, pituitary, pancreatic tumors     Family history of thyroid disorder: Not known   No neck radiation    Interval history  Central hypothyroidism:    Takes Levothyroxine 50 mcg one tab six days a week and two on Sunday. in morning empty stomach with water and waits for 30 minutes. Energy levels good   Working out at home. Weight is stable. Working in [employer], 3 days a week . In summer she was having difficulty to catch breath. It happens when she is playing volley ball outside   Heat intolerance is better. Temp sensitivity has improved   Headaches only with periods  No Hair or skin changes   Anxiety is under control. Taking paroxetine   Denies palpitations, sleep issues (on sleeping aid- melatonin some days)    No neck pain    No recent cold or fever   No OTC meds     The following portions of the patient's history were reviewed and updated as appropriate:       Family History   Problem Relation Age of Onset    Other Mother         Dermayostosis    Heart Failure Father     Heart Disease Sister     Stroke Sister     Heart Attack Sister     Cancer Maternal Grandmother     Kidney Disease Maternal Grandfather             Social History     Socioeconomic History    Marital status:      Spouse name: Not on file    Number of children: Not on file    Years of education: Not on file    Highest education level: Not on file   Occupational History    Not on file   Tobacco Use    Smoking status: Never Smoker    Smokeless tobacco: Never Used   Vaping Use    Vaping Use: Never used   Substance and Sexual Activity    Alcohol use: Never    Drug use: Never    Sexual activity: Not on file   Other Topics Concern    Not on file   Social History Narrative    Not on file     Social Determinants of Health     Financial Resource Strain:     Difficulty of Paying Living Expenses: Not on file   Food Insecurity:     Worried About Running Out of Food in the Last Year: Not on file    Cyndie of Food in the Last Year: Not on file   Transportation Needs:     Lack of Transportation (Medical): Not on file    Lack of Transportation (Non-Medical):  Not on file Physical Activity:     Days of Exercise per Week: Not on file    Minutes of Exercise per Session: Not on file   Stress:     Feeling of Stress : Not on file   Social Connections:     Frequency of Communication with Friends and Family: Not on file    Frequency of Social Gatherings with Friends and Family: Not on file    Attends [employer] Services: Not on file    Active Member of [address] [place] [address] or Organizations: Not on file    Attends Club or Organization Meetings: Not on file    Marital Status: Not on file   Intimate Partner Violence:     Fear of Current or Ex-Partner: Not on file    Emotionally Abused: Not on file    Physically Abused: Not on file    Sexually Abused: Not on file   Housing Stability:     Unable to Pay for Housing in the Last Year: Not on file    Number of [place] in the Last Year: Not on file    Unstable Housing in the Last Year: Not on file           Past Medical History:   Diagnosis Date    Anxiety     Migraines          Past Surgical History:   Procedure Laterality Date    APPENDECTOMY      SKIN BIOPSY      TUBAL LIGATION             Allergies   Allergen Reactions    Penicillins Other (See Comments)     Other reaction(s): Headache  Has only taken in one time and got a headache             Current Outpatient Medications:     methocarbamol (ROBAXIN) 500 MG tablet, TAKE 1 TABLET BY MOUTH EVERY 8 HOURS AS NEEDED FOR 30 DAYS, Disp: , Rfl:     levothyroxine (SYNTHROID) 50 MCG tablet, TAKE 1 TABLET BY MOUTH 6 DAYS A WEEK AND 2 TABLETS ON SUNDAY, Disp: 103 tablet, Rfl: 2    PARoxetine (PAXIL) 30 MG tablet, Take 30 mg by mouth every morning , Disp: , Rfl:     hydroCHLOROthiazide (HYDRODIURIL) 25 MG tablet, TAKE 1 TABLET BY MOUTH EVERY DAY, Disp: 30 tablet, Rfl: 8    lisinopril (PRINIVIL;ZESTRIL) 2.5 MG tablet, Take 2.5 mg by mouth daily , Disp: , Rfl:       Review of Systems:  Constitutional: Negative for fever, chills.    HENT: Negative for congestion, ear pain, rhinorrhea,  sore 7.0  6.0 - 8.3 g/dL Final    Albumin 02/09/2022 4.1  3.5 - 5.7 g/dL Final    Total Bilirubin 02/09/2022 0.4  0.3 - 1.0 mg/dl Final    Albumin/Globulin Ratio 02/09/2022 1.4  1.0 - 2.0 Final    Globulin 02/09/2022 2.9  2.6 - 4.2 g/dL Final    Gfr Calculated 02/09/2022 > 60  >60  ml/min/1.73m2 Final    Gfr Calculated 02/09/2022 > 60  >60 ml/min/1.73m2 Final    IgA, Serum 02/09/2022 255.9  70 - 400 mg/dL Final    wr-CRP 02/09/2022 < 5.00  <=5.00 mg/L Final    T3, Free 02/09/2022 3.1  2.5 - 3.9 pg/mL Final    TSH 02/09/2022 1.162  0.450 - 5.330 uIU/mL Final    T4 Free 02/09/2022 0.77  0.61 - 1.12 ng/dL Final    T3, Total 02/09/2022 83  71 - 180 ng/dL Final    Transglutaminase IgA 02/09/2022 <2  0 - 3 U/mL Final   Hospital Outpatient Visit on 08/26/2021   Component Date Value Ref Range Status    T3, Free 08/26/2021 2.1* 2.3 - 4.2 pg/mL Final    T4 Free 08/26/2021 1.4  0.9 - 1.8 ng/dL Final    TSH 08/26/2021 0.50  0.27 - 4.20 uIU/mL Final   Hospital Outpatient Visit on 05/17/2021   Component Date Value Ref Range Status    T3, Free 05/17/2021 2.5  2.3 - 4.2 pg/mL Final    T4 Free 05/17/2021 1.1  0.9 - 1.8 ng/dL Final    TSH 05/17/2021 0.48  0.27 - 4.20 uIU/mL Final    Sodium 05/17/2021 138  136 - 145 mmol/L Final    Potassium 05/17/2021 4.4  3.5 - 5.1 mmol/L Final    Chloride 05/17/2021 101  99 - 110 mmol/L Final    CO2 05/17/2021 28  21 - 32 mmol/L Final    Anion Gap 05/17/2021 9  3 - 16 Final    Glucose 05/17/2021 76  70 - 99 mg/dL Final    BUN 05/17/2021 12  7 - 20 mg/dL Final    CREATININE 05/17/2021 0.9  0.6 - 1.1 mg/dL Final    GFR Non- 05/17/2021 >60  >60 Final    GFR  05/17/2021 >60  >60 Final    Calcium 05/17/2021 9.3  8.3 - 10.6 mg/dL Final        No results found. Assessment/Plan:     Jenean Boas was seen today for follow-up. Diagnoses and all orders for this visit:    Central hypothyroidism  -     TSH;  Future  -     T4, Free; Future  -     T3, Free; Future  -     levothyroxine (SYNTHROID) 50 MCG tablet; TAKE 1 TABLET BY MOUTH 6 DAYS A WEEK AND 2 TABLETS ON SUNDAY        1: Pituitary adenoma   Hyperprolactinemia      Prolactin - Nov 2019   Prolactin - 15 March 2020     Secondary hormonal work up normal - aug 2019     MRI brain: April 2020   1. The previous hypoenhancing abnormality in the right posterior pituitary gland is no longer clearly visualized. No suspicious pituitary abnormality identified. 2. Minimal punctate nonspecific white matter signal abnormality is most consistent with minimal small vessel ischemic disease or minimal age related degenerative change. No change in comparison to previous exam.  3. No acute intracranial abnormality.      No need to repeat MRI unless clinically indicated     2: Galactorrhea   Fam h/o breast cancer     Mammogram and US normal Nov 2019     3: Central hypothyroidism   History of low FT4 for quite sometime     Free FT4 is good now, T3 is normal - Feb 2022      Levels are different but its done at a different lab     Because of her symptoms of heat sensitivity and dyspnea, will continue same dose of Levothyroxine 50 mcg six days a week and two tabs on Sunday     Education: Reviewed how to properly take thyroid replacement. Instructed to take daily with water on an empty stomach without concomitant vitamins or calcium or other medicine. Wait for 30-45 minutes before eating. If patient is confident they missed a day of pills, they can take the missed dose with their next tablet (only for levothyroxine).     4: HTN  Managed by pcp   Meds changed recently     TSH, FT4, FT3, TT3 in 6 months       RTC in 9-12 months    Next OV in person     Electronically signed by Stacey Elizabeth MD on 2/28/2022 at 2:44 PM

## 2022-11-30 DIAGNOSIS — E03.8 CENTRAL HYPOTHYROIDISM: ICD-10-CM

## 2022-11-30 RX ORDER — LEVOTHYROXINE SODIUM 0.05 MG/1
TABLET ORAL
Qty: 96 TABLET | Refills: 0 | Status: SHIPPED | OUTPATIENT
Start: 2022-11-30 | End: 2022-12-05 | Stop reason: SDUPTHER

## 2022-11-30 NOTE — TELEPHONE ENCOUNTER
Requested Refill:   Requested Prescriptions     Pending Prescriptions Disp Refills    levothyroxine (SYNTHROID) 50 MCG tablet [Pharmacy Med Name: LEVOTHYROXINE 50 MCG TABLET] 96 tablet 3     Sig: TAKE 1 TABLET BY MOUTH 6 DAYS A WEEK AND 2 TABLETS ON SUNDAY       Last refilled:  2/28/2022 # 103 with 2 refills     Last Appt: VV 2/28/2022  Next Appt: 12/8/2022

## 2022-12-01 ENCOUNTER — HOSPITAL ENCOUNTER (OUTPATIENT)
Age: 42
Discharge: HOME | End: 2022-12-01

## 2022-12-01 DIAGNOSIS — E03.8 CENTRAL HYPOTHYROIDISM: ICD-10-CM

## 2022-12-01 LAB
T3 FREE: 2.3 PG/ML (ref 2.3–4.2)
T4 FREE: 1.1 NG/DL (ref 0.9–1.8)
TSH SERPL DL<=0.05 MIU/L-ACNC: 0.92 UIU/ML (ref 0.27–4.2)

## 2022-12-01 PROCEDURE — 84481 FREE ASSAY (FT-3): CPT

## 2022-12-01 PROCEDURE — 36415 COLL VENOUS BLD VENIPUNCTURE: CPT

## 2022-12-01 PROCEDURE — 84439 ASSAY OF FREE THYROXINE: CPT

## 2022-12-01 PROCEDURE — 84443 ASSAY THYROID STIM HORMONE: CPT

## 2022-12-05 ENCOUNTER — OFFICE VISIT (OUTPATIENT)
Dept: ENDOCRINOLOGY | Age: 42
End: 2022-12-05
Payer: MEDICAID

## 2022-12-05 VITALS
HEIGHT: 64 IN | OXYGEN SATURATION: 98 % | HEART RATE: 64 BPM | DIASTOLIC BLOOD PRESSURE: 82 MMHG | SYSTOLIC BLOOD PRESSURE: 120 MMHG | WEIGHT: 173.2 LBS | BODY MASS INDEX: 29.57 KG/M2

## 2022-12-05 DIAGNOSIS — E03.8 CENTRAL HYPOTHYROIDISM: Primary | ICD-10-CM

## 2022-12-05 DIAGNOSIS — E22.1 HYPERPROLACTINEMIA (HCC): ICD-10-CM

## 2022-12-05 PROCEDURE — 3079F DIAST BP 80-89 MM HG: CPT | Performed by: INTERNAL MEDICINE

## 2022-12-05 PROCEDURE — 99214 OFFICE O/P EST MOD 30 MIN: CPT | Performed by: INTERNAL MEDICINE

## 2022-12-05 PROCEDURE — 3074F SYST BP LT 130 MM HG: CPT | Performed by: INTERNAL MEDICINE

## 2022-12-05 RX ORDER — LEVOTHYROXINE SODIUM 0.07 MG/1
TABLET ORAL
Qty: 90 TABLET | Refills: 2 | Status: SHIPPED | OUTPATIENT
Start: 2022-12-05

## 2022-12-05 NOTE — PROGRESS NOTES
Patient ID: Radha Eden is a 40 y.o. female    Chief Complaint: Hyperprolactinemia, pituitary microadenoma, central hypothyroidism        HPI:   Radha Eden is here for an evaluation of hyperprolactinemia. MRI Aug 2019: 3 mm nonenhancing nodular focus within the right half of the pituitary gland with associated focal contour abnormality, likely representing the clinically suspected prolactinoma. Prolactin 23.6, Aug 2019. Prolactin 18, at [facility] 11 Aug 2019. Other work up was okay. Prolactin 15 Nov 2019. She has breast discharge since giving birth in 2016. She still has breast discharge  Materna GM had breast cancer. She has PMS symptoms of headaches. Mentsrual cycles are regular. S/p tubal ligation. Denies Breast exam/Mammogram/ Nipple piercing      Denies drugs (heroine)   Denies family history of thyroid, parathyroid, pituitary, pancreatic tumors     Family history of thyroid disorder: Not known   No neck radiation    Interval history  Central hypothyroidism:    Takes Levothyroxine 50 mcg one tab six days a week and two on Sunday. in morning empty stomach with water and waits for 30 minutes. Energy levels good   Working out at home. Weight is stable. In summer she was having difficulty to catch breath. It happens when she is playing [employer] ball outside   Heat intolerance is better. Has temp sensitivity   Cycles were regular but did not had this month . S/p tubal ligation   Headaches are better   No hair or skin changes   More anxious lately. (May be due to new job, she thinks).   Taking paroxetine   Denies palpitations, sleep issues (on sleeping aid- melatonin some days)    Has neck pain    No recent cold or fever   No OTC meds     The following portions of the patient's history were reviewed and updated as appropriate:       Family History   Problem Relation Age of Onset    Other Mother         Dermayostosis    Heart Failure Father     Heart Disease Sister     Stroke Sister Heart Attack Sister     Cancer Maternal Grandmother     Kidney Disease Maternal Grandfather               Social History     Socioeconomic History    Marital status:      Spouse name: Not on file    Number of children: Not on file    Years of education: Not on file    Highest education level: Not on file   Occupational History    Not on file   Tobacco Use    Smoking status: Never    Smokeless tobacco: Never   Vaping Use    Vaping Use: Never used   Substance and Sexual Activity    Alcohol use: Never    Drug use: Never    Sexual activity: Not on file   Other Topics Concern    Not on file   Social History Narrative    Not on file     Social Determinants of Health     Financial Resource Strain: Not on file   Food Insecurity: Not on file   Transportation Needs: Not on file   Physical Activity: Not on file   Stress: Not on file   Social Connections: Not on file   Intimate Partner Violence: Not on file   Housing Stability: Not on file             Past Medical History:   Diagnosis Date    Anxiety     Migraines          Past Surgical History:   Procedure Laterality Date    APPENDECTOMY      SKIN BIOPSY      TUBAL LIGATION               Allergies   Allergen Reactions    Penicillins Other (See Comments)     Other reaction(s): Headache  Has only taken in one time and got a headache             Current Outpatient Medications:     levothyroxine (SYNTHROID) 75 MCG tablet, One tab daily in am, Disp: 90 tablet, Rfl: 2    methocarbamol (ROBAXIN) 500 MG tablet, TAKE 1 TABLET BY MOUTH EVERY 8 HOURS AS NEEDED FOR 30 DAYS, Disp: , Rfl:     PARoxetine (PAXIL) 30 MG tablet, Take 30 mg by mouth every morning , Disp: , Rfl:     hydroCHLOROthiazide (HYDRODIURIL) 25 MG tablet, TAKE 1 TABLET BY MOUTH EVERY DAY, Disp: 30 tablet, Rfl: 8    lisinopril (PRINIVIL;ZESTRIL) 2.5 MG tablet, Take 2.5 mg by mouth daily , Disp: , Rfl:       Review of Systems:  Constitutional: Negative for fever, chills.    HENT: Negative for congestion, ear pain, rhinorrhea,  sore throat and trouble swallowing. Eyes: Negative for photophobia, redness, itching. Respiratory: Negative for cough, shortness of breath and sputum. Cardiovascular: Negative for chest pain and leg swelling. Gastrointestinal: Negative for nausea, vomiting, abdominal pain, diarrhea, constipation. Endocrine: Negative for polydipsia, polyphagia and polyuria. Genitourinary: Negative for dysuria, urgency, frequency, hematuria and flank pain. Musculoskeletal: Negative for myalgias, back pain, arthralgias. Skin/Nail: Negative for rash, itching. Normal nails. Neurological: Negative for seizures, light-headedness, numbness and headaches. Hematological/ Lymph nodes: Negative for adenopathy. Does not bruise/bleed easily. Psychiatric/Behavioral: Negative for suicidal ideas and decreased concentration. Physical Exam:  /82 (Site: Right Upper Arm, Position: Sitting, Cuff Size: Large Adult)   Pulse 64   Ht 5' 4\" (1.626 m)   Wt 173 lb 3.2 oz (78.6 kg)   LMP 11/28/2022   SpO2 98%   BMI 29.73 kg/m²     Constitutional: Patient is oriented to person, place, and time. Patient appears well-developed and well-nourished. HENT:    Head: Normocephalic and atraumatic. Eyes: Conjunctivae and EOM are normal.     Neck: Normal range of motion. Thyroid normal.   Cardiovascular: Normal rate, regular rhythm and normal heart sounds. Pulmonary/Chest: Effort normal and breath sounds normal.    Musculoskeletal: Normal range of motion. Neurological: Patient is alert and oriented to person, place, and time. Patient has normal reflexes. No fine tremors of hands. Skin: Skin is warm and dry. Psychiatric: Patient has a normal mood and affect.  Patient behavior is normal.     Lab Review:    Hospital Outpatient Visit on 12/01/2022   Component Date Value Ref Range Status    T3, Free 12/01/2022 2.3  2.3 - 4.2 pg/mL Final    T4 Free 12/01/2022 1.1  0.9 - 1.8 ng/dL Final    TSH 12/01/2022 0.92 0.27 - 4.20 uIU/mL Final   Orders Only on 02/09/2022   Component Date Value Ref Range Status    Lipase 02/09/2022 32  11 - 82 U/L Final    Sodium 02/09/2022 137  136 - 145 mmol/L Final    Potassium 02/09/2022 3.7  3.5 - 5.3 mmol/L Final    Chloride 02/09/2022 102  98 - 107 mmol/L Final    CO2 02/09/2022 28  21 - 31 mmol/L Final    Anion Gap 4 02/09/2022 7  7 - 16 mmol/L Final    Glucose 02/09/2022 83  74 - 109 mg/dL Final    BUN 02/09/2022 20  7 - 25 mg/dL Final    Creatinine + eGFR Panel 02/09/2022 0.8  0.6 - 1.2 mg/dL Final    Calcium 02/09/2022 9.4  8.6 - 10.2 mg/dL Final    AST 02/09/2022 16  15 - 39 U/L Final    ALT 02/09/2022 9  7 - 52 U/L Final    Alkaline Phosphatase 02/09/2022 54  34 - 104 U/L Final    Albumin/Protein Total 02/09/2022 7.0  6.0 - 8.3 g/dL Final    Albumin 02/09/2022 4.1  3.5 - 5.7 g/dL Final    Total Bilirubin 02/09/2022 0.4  0.3 - 1.0 mg/dl Final    Albumin/Globulin Ratio 02/09/2022 1.4  1.0 - 2.0 Final    Globulin 02/09/2022 2.9  2.6 - 4.2 g/dL Final    Gfr Calculated 02/09/2022 > 60  >60  ml/min/1.73m2 Final    Gfr Calculated 02/09/2022 > 60  >60 ml/min/1.73m2 Final    IgA, Serum 02/09/2022 255.9  70 - 400 mg/dL Final    wr-CRP 02/09/2022 < 5.00  <=5.00 mg/L Final    T3, Free 02/09/2022 3.1  2.5 - 3.9 pg/mL Final    TSH 02/09/2022 1.162  0.450 - 5.330 uIU/mL Final    T4 Free 02/09/2022 0.77  0.61 - 1.12 ng/dL Final    T3, Total 02/09/2022 83  71 - 180 ng/dL Final    Transglutaminase IgA 02/09/2022 <2  0 - 3 U/mL Final        No results found. Assessment/Plan:     Elizabeth Garcia was seen today for follow-up. Diagnoses and all orders for this visit:    Central hypothyroidism  -     levothyroxine (SYNTHROID) 75 MCG tablet; One tab daily in am  -     TSH; Future  -     T4, Free; Future  -     T3, Free; Future    Hyperprolactinemia (HCC)  -     PREGNANCY, URINE; Future  -     Prolactin;  Future        1: Pituitary adenoma   Hyperprolactinemia      Prolactin - Nov 2019   Prolactin - 15 March 2020     Secondary hormonal work up normal - aug 2019     MRI brain: April 2020   1. The previous hypoenhancing abnormality in the right posterior pituitary gland is no longer clearly visualized. No suspicious pituitary abnormality identified. 2. Minimal punctate nonspecific white matter signal abnormality is most consistent with minimal small vessel ischemic disease or minimal age related degenerative change. No change in comparison to previous exam.  3. No acute intracranial abnormality. No need to repeat MRI unless clinically indicated     2: Galactorrhea   Fam h/o breast cancer     Mammogram and US normal Nov 2019     3: Central hypothyroidism   History of low FT4 for quite sometime     TSH 0.92, free T4 1.1, Free T3 2.3 - Dec 2022   Free levels are good - Dec 2022   Free T3 is low normal     Change Levothyroxine 75 mcg one tab daily in am     Education: Reviewed how to properly take thyroid replacement. Instructed to take daily with water on an empty stomach without concomitant vitamins or calcium or other medicine. Wait for 30-45 minutes before eating. If patient is confident they missed a day of pills, they can take the missed dose with their next tablet (only for levothyroxine).     4: HTN  Managed by pcp   Meds changed recently     Prolactin and pregnancy test today       TSH, FT4, FT3, TT3 in 6 months       RTC in 6 months    Next OV in person     Electronically signed by Braeden Brandt MD on 12/5/2022 at 11:29 AM

## 2022-12-06 ENCOUNTER — TELEPHONE (OUTPATIENT)
Dept: ENDOCRINOLOGY | Age: 42
End: 2022-12-06

## 2022-12-06 LAB
HCG(URINE) PREGNANCY TEST: NEGATIVE
PROLACTIN: 11 NG/ML

## 2022-12-06 NOTE — TELEPHONE ENCOUNTER
----- Message from Laury Andrade MD sent at 12/6/2022  7:54 AM EST -----  Prolactin level is normal, pregnancy test is negative   Pls ask her to see GYN for missed cycle  Thanks

## 2022-12-14 DIAGNOSIS — I10 ESSENTIAL HYPERTENSION: ICD-10-CM

## 2022-12-14 RX ORDER — HYDROCHLOROTHIAZIDE 25 MG/1
TABLET ORAL
Qty: 30 TABLET | Refills: 8 | Status: SHIPPED | OUTPATIENT
Start: 2022-12-14

## 2022-12-14 NOTE — TELEPHONE ENCOUNTER
Requested Refill:   Requested Prescriptions     Pending Prescriptions Disp Refills    hydroCHLOROthiazide (HYDRODIURIL) 25 MG tablet [Pharmacy Med Name: HYDROCHLOROTHIAZIDE 25 MG TAB] 30 tablet 8     Sig: TAKE 1 TABLET BY MOUTH EVERY DAY       Last refilled:  8/16/2021 # 30 with 8 refills     Last Appt: VV 12/5/2022  Next Appt: 6/5/2023

## 2023-04-04 ENCOUNTER — PROCEDURE VISIT (OUTPATIENT)
Dept: NEUROLOGY | Age: 43
End: 2023-04-04
Payer: MEDICAID

## 2023-04-04 DIAGNOSIS — G56.03 BILATERAL CARPAL TUNNEL SYNDROME: Primary | ICD-10-CM

## 2023-04-04 PROCEDURE — 95886 MUSC TEST DONE W/N TEST COMP: CPT | Performed by: PSYCHIATRY & NEUROLOGY

## 2023-04-04 PROCEDURE — 95911 NRV CNDJ TEST 9-10 STUDIES: CPT | Performed by: PSYCHIATRY & NEUROLOGY

## 2023-04-04 NOTE — PROGRESS NOTES
Tarsha David M.D. [facility]) Physicians/[facility]  Board Certified in [address], [address] [place] [address]    EMG / NERVE CONDUCTION STUDY      PATIENT:  Abdoulaye Syed       DATE OF EM23     YOB: 1980       REASON FOR EMG:   Bilateral arm pain and numbness      REFERRING PHYSICIAN:  Kunal Sidhu MD  [address] [facility] [address],  [address]     SUMMARY:   Bilateral median motor nerve studies with prolonged distal latencies. Bilateral ulnar motor nerve studies were normal.  Bilateral median sensory nerve studies are prolonged distal latencies. Bilateral ulnar sensory nerve studies were normal.  The left radial sensory nerve study was normal.  Needle EMG of several muscles of both upper extremities was normal.      CLINICAL DIAGNOSIS:  Carpal tunnel syndrome        EMG RESULTS:     This patient has moderately severe bilateral median nerve lesions at the wrist.  (Carpal tunnel syndrome). The right side is relatively more involved when compared to the left side. ---------------------------------------------  Tarsha David M.D.   Electromyographer / Neurologist

## 2023-06-01 ENCOUNTER — HOSPITAL ENCOUNTER (OUTPATIENT)
Age: 43
Discharge: HOME | End: 2023-06-01
Payer: MEDICAID

## 2023-06-01 DIAGNOSIS — E03.8 CENTRAL HYPOTHYROIDISM: ICD-10-CM

## 2023-06-01 LAB
T3FREE SERPL-MCNC: 2 PG/ML (ref 2.3–4.2)
T4 FREE SERPL-MCNC: 1.2 NG/DL (ref 0.9–1.8)
TSH SERPL DL<=0.005 MIU/L-ACNC: 0.59 UIU/ML (ref 0.27–4.2)

## 2023-06-01 PROCEDURE — 36415 COLL VENOUS BLD VENIPUNCTURE: CPT

## 2023-06-01 PROCEDURE — 84481 FREE ASSAY (FT-3): CPT

## 2023-06-01 PROCEDURE — 84439 ASSAY OF FREE THYROXINE: CPT

## 2023-06-01 PROCEDURE — 84443 ASSAY THYROID STIM HORMONE: CPT

## 2023-06-05 ENCOUNTER — OFFICE VISIT (OUTPATIENT)
Dept: ENDOCRINOLOGY | Age: 43
End: 2023-06-05
Payer: MEDICAID

## 2023-06-05 VITALS
BODY MASS INDEX: 29.33 KG/M2 | WEIGHT: 171.8 LBS | HEART RATE: 82 BPM | OXYGEN SATURATION: 98 % | HEIGHT: 64 IN | DIASTOLIC BLOOD PRESSURE: 90 MMHG | SYSTOLIC BLOOD PRESSURE: 120 MMHG

## 2023-06-05 DIAGNOSIS — E03.8 CENTRAL HYPOTHYROIDISM: ICD-10-CM

## 2023-06-05 PROCEDURE — 3075F SYST BP GE 130 - 139MM HG: CPT | Performed by: INTERNAL MEDICINE

## 2023-06-05 PROCEDURE — 99214 OFFICE O/P EST MOD 30 MIN: CPT | Performed by: INTERNAL MEDICINE

## 2023-06-05 PROCEDURE — 3080F DIAST BP >= 90 MM HG: CPT | Performed by: INTERNAL MEDICINE

## 2023-06-05 RX ORDER — LEVOTHYROXINE SODIUM 0.07 MG/1
TABLET ORAL
Qty: 90 TABLET | Refills: 2 | Status: SHIPPED | OUTPATIENT
Start: 2023-06-05

## 2023-06-05 RX ORDER — LIOTHYRONINE SODIUM 5 UG/1
5 TABLET ORAL DAILY
Qty: 90 TABLET | Refills: 1 | Status: SHIPPED | OUTPATIENT
Start: 2023-06-05 | End: 2024-06-04

## 2023-06-05 NOTE — PROGRESS NOTES
Patient ID: Diamond Razo is a 44 y.o. female    Chief Complaint: Hyperprolactinemia, pituitary microadenoma, central hypothyroidism        HPI:   Diamond Razo is here for an evaluation of hyperprolactinemia. MRI Aug 2019: 3 mm nonenhancing nodular focus within the right half of the pituitary gland with associated focal contour abnormality, likely representing the clinically suspected prolactinoma. Prolactin 23.6, Aug 2019. Prolactin 18, at [facility] 11 Aug 2019. Other work up was okay. Prolactin 15 Nov 2019. She has breast discharge since giving birth in 2016. She still has breast discharge  Materna GM had breast cancer. She has PMS symptoms of headaches. Mentsrual cycles are regular. S/p tubal ligation. Denies Breast exam/Mammogram/ Nipple piercing      Denies drugs (heroine)   Denies family history of thyroid, parathyroid, pituitary, pancreatic tumors     Family history of thyroid disorder: Not known   No neck radiation    Interval history  Central hypothyroidism:    Takes Levothyroxine 75 mcg one tab daily in morning empty stomach with water and waits for 30 minutes. Energy levels good   Working out at home. Weight is down 2 lbs. Plays [employer] ball twice a week. In summer (when really hot) she was having difficulty to catch breath. It happens when she is playing [employer] ball outside   Heat intolerance is better. Has temp sensitivity   Cycles were regular but did not until last time. S/p tubal ligation   Headaches are not bad anymore   No hair or skin changes   Mood is okay. Anxiety is better. (May be due to new job, she thinks).   Taking paroxetine   Denies palpitations, sleep issues (on sleeping aid- melatonin some days)    Has neck pain    No recent cold or fever   No OTC meds     The following portions of the patient's history were reviewed and updated as appropriate:       Family History   Problem Relation Age of Onset    Other Mother         Dermayostosis    Heart Failure

## 2023-09-09 LAB
T3 FREE: 2.7 PG/ML (ref 2.5–3.9)
T4 FREE: 0.78 NG/DL (ref 0.61–1.12)
TSH ULTRASENSITIVE: 0.18 UIU/ML (ref 0.45–5.33)

## 2023-09-10 LAB — T3 TOTAL: 88 NG/DL (ref 71–180)

## 2023-11-03 ENCOUNTER — OFFICE VISIT (OUTPATIENT)
Dept: ORTHOPEDIC SURGERY | Age: 43
End: 2023-11-03

## 2023-11-03 VITALS — BODY MASS INDEX: 29.19 KG/M2 | WEIGHT: 171 LBS | RESPIRATION RATE: 16 BRPM | HEIGHT: 64 IN

## 2023-11-03 DIAGNOSIS — G56.02 CARPAL TUNNEL SYNDROME OF LEFT WRIST: Primary | ICD-10-CM

## 2023-11-03 RX ORDER — TRIAMCINOLONE ACETONIDE 40 MG/ML
40 INJECTION, SUSPENSION INTRA-ARTICULAR; INTRAMUSCULAR ONCE
Status: COMPLETED | OUTPATIENT
Start: 2023-11-03 | End: 2023-11-03

## 2023-11-03 RX ADMIN — TRIAMCINOLONE ACETONIDE 40 MG: 40 INJECTION, SUSPENSION INTRA-ARTICULAR; INTRAMUSCULAR at 14:15

## 2023-11-03 NOTE — PROGRESS NOTES
Ms. Coreen Kruse returns today in follow-up of her previously treated  bilateral Carpal Tunnel Syndrome. She was last seen by Dr. Agustin Caballero. Leonard Burkett in June, 2023 at which time she was treated with Steroid injection to the Bilateral, Carpal Tunnel. She experienced moderate relief of her initial symptoms. She  has noticed symptom worsening over the last several weeks in the left hand. She returns today with worsened symptoms of left numbness in the Median Innervated digits and tingling in the Median Innervated digits, requesting further treatment. Due to the dynamic and progressive nature of Carpal Tunnel Syndrome, It is clinically necessary to carefully re-evaluate Ms. Coreen Kruse today, prior to proceeding with any further treatment or intervention, to assure the clinical appropriateness of any previously considered treatment, at this time. Previous treatment for carpal tunnel syndrome has included Prior Steroid Injection to the both Carpal Tunnel which relieved her symptoms Mostly. Any relief experienced was TEMPORARY. .      I have today reviewed with Coreen Kruse the clinically relevant, past medical history, medications, allergies,  family history, social history, and Review Of Systems & I have documented any details relevant to today's presenting complaints in my history above. Ms. Almas Casey's self-reported past medical history, medications, allergies,  family history, social history, and Review Of Systems have been scanned into the chart under the \"Media\" tab. Physical Exam:  Vitals  Respirations: 16  Height: 162.6 cm (5' 4\")  Weight - Scale: 77.6 kg (171 lb)  Ms. Coreen Kruse appears well, she is in no apparent distress, she demonstrates appropriate mood & affect.       Skin: Normal in appearance, Normal Color, and Free of Lesions Bilaterally   Digital range of motion is Full bilaterally  Wrist range of motion is Full bilaterally  There is no evidence of gross joint instability

## 2023-11-03 NOTE — PROGRESS NOTES
Administrations This Visit       triamcinolone acetonide (KENALOG-40) injection 40 mg       Admin Date  11/03/2023  14:15 Action  Given Dose  40 mg Route  Intra-artICUlar Site  Hand Left Administered By  Sarthak Nicolas MA    Ordering Provider: Andres Grover PA-C    NDC: 4607-6818-71    Lot#: [id number]    : [employer] [place] (PRIMARY CARE)    Patient Supplied?: No

## 2024-03-11 ENCOUNTER — OFFICE VISIT (OUTPATIENT)
Dept: ORTHOPEDIC SURGERY | Age: 44
End: 2024-03-11

## 2024-03-11 VITALS — RESPIRATION RATE: 16 BRPM | BODY MASS INDEX: 29.19 KG/M2 | HEIGHT: 64 IN | WEIGHT: 171 LBS

## 2024-03-11 DIAGNOSIS — G56.03 BILATERAL CARPAL TUNNEL SYNDROME: Primary | ICD-10-CM

## 2024-03-11 RX ORDER — TRIAMCINOLONE ACETONIDE 40 MG/ML
40 INJECTION, SUSPENSION INTRA-ARTICULAR; INTRAMUSCULAR ONCE
Status: COMPLETED | OUTPATIENT
Start: 2024-03-11 | End: 2024-03-11

## 2024-03-11 RX ADMIN — TRIAMCINOLONE ACETONIDE 40 MG: 40 INJECTION, SUSPENSION INTRA-ARTICULAR; INTRAMUSCULAR at 16:06

## 2024-03-11 NOTE — PROGRESS NOTES
Administrations This Visit       triamcinolone acetonide (KENALOG-40) injection 40 mg       Admin Date  03/11/2024  16:06 Action  Given Dose  40 mg Route  Intra-artICUlar Site  Hand Right Administered By  Shanelle West MA    Ordering Provider: Joel Bynum MD    NDC: 9493-3407-65    Lot#: [id number]    : [email or web address] [place] (PRIMARY CARE)    Patient Supplied?: No

## 2024-03-12 NOTE — PROGRESS NOTES
Ms. Brandi Casey returns today in follow-up of her previously treated  bilateral Carpal Tunnel Syndrome.  She was last seen by me in November, 2023 at which time she was treated with Steroid injection to the Left, Carpal Tunnel.  She experienced moderate relief of her initial symptoms.  She  has noticed right side symptom worsening over the last several months.  She returns today with worsened symptoms of right numbness in the Median Innervated digits, tingling in the Median Innervated digits, volar forearm aching, and nocturnal symptoms awakening her from sleep, requesting further treatment.  Due to the dynamic and progressive nature of Carpal Tunnel Syndrome, It is clinically necessary to carefully re-evaluate Ms. Brandi Casey today, prior to proceeding with any further treatment or intervention, to assure the clinical appropriateness of any previously considered treatment, at this time.  Previous treatment for carpal tunnel syndrome has included Prior Steroid Injection to the both Carpal Tunnel which relieved her symptoms Moderately.  Any relief experienced was TEMPORARY..      I have today reviewed with Brandi Casey the clinically relevant, past medical history, medications, allergies,  family history, social history, and Review Of Systems & I have documented any details relevant to today's presenting complaints in my history above.  Ms. Brandi Casey's self-reported past medical history, medications, allergies,  family history, social history, and Review Of Systems have been scanned into the chart under the \"Media\" tab.    Physical Exam:  Vitals  Respirations: 16  Height: 162.6 cm (5' 4\")  Weight - Scale: 77.6 kg (171 lb)  Ms. Brandi Casey appears well, she is in no apparent distress, she demonstrates appropriate mood & affect.      Skin: Normal in appearance, Normal Color, and Free of Lesions Bilaterally   Digital range of motion is without significant limitation bilaterally  Wrist range of

## 2024-03-12 NOTE — PATIENT INSTRUCTIONS
Information & Instructions   After Finger, Hand, Wrist, or Elbow Injection    Joel Bynum MD    You have received an injection of local anesthetic (Bupivicaine without Epinephrine) for comfort & a steroid (Kenalog) for it’s strong anti-inflammatory effects. In order to give the medication a chance to reduce your inflammation and discomfort, it is recommended that you take it easy for a day or so.  You may use your hand and arm as you feel comfortable, but you should avoid highly strenuous activity and heavy use for several days.    Relief from the injection will often not begin for several days, and you may not feel full relief for up to one month.      It is not uncommon to experience some local discomfort or pain at or around the injection site for a few days.  To relieve these symptoms you may do the following if you feel necessary:       Apply ice to the affected area 20 minutes on and 20 minutes off.   Do not apply ice directly to the skin. Use a thin layer (T-shirt, pillowcase, towel, etc.) to protect the skin.     - If allowed by your other medical physicians, you may take -     2 Tylenol extra strength tablets every 4-6 hours       1-2 Aleve tablets twice a day     2-3 Advil tablets two to three times a day    If you are diabetic, the steroid medication may increase your blood sugar, so you are advised to monitor your sugar more closely so you can adjust it accordingly for a few days following your injection.  If you need assistance with the control of you blood sugar, please contact you primary care physician for further advice.    I will request that you please call the office one month after your injection at [phone] if you have not experienced relief of your symptoms (unless I have instructed you otherwise).  If your injection has given you good relief of you symptoms as expected, then you only need to call the office if your symptoms return.

## 2024-03-17 DIAGNOSIS — E03.8 CENTRAL HYPOTHYROIDISM: ICD-10-CM

## 2024-03-18 RX ORDER — LEVOTHYROXINE SODIUM 0.07 MG/1
TABLET ORAL
Qty: 30 TABLET | Refills: 0 | Status: SHIPPED | OUTPATIENT
Start: 2024-03-18

## 2024-03-18 NOTE — TELEPHONE ENCOUNTER
Medication:   Requested Prescriptions     Pending Prescriptions Disp Refills    levothyroxine (SYNTHROID) 75 MCG tablet [Pharmacy Med Name: LEVOTHYROXINE 75 MCG TABLET] 30 tablet 0     Sig: TAKE 1 TABLET BY MOUTH EVERY MORNING       Last Filled:      Patient Phone Number: [phone] (home)     Last appt: 6/5/2023   Next appt: 4/1/2024    Last Thyroid:   Lab Results   Component Value Date/Time    TSH 0.181 09/09/2023 08:55 AM    TSH 0.59 06/01/2023 11:30 AM    FT3 2.7 09/09/2023 08:55 AM    O6JTVSB 88 09/09/2023 08:55 AM    T4FREE 0.78 09/09/2023 08:55 AM

## 2024-04-03 ENCOUNTER — OFFICE VISIT (OUTPATIENT)
Dept: ENDOCRINOLOGY | Age: 44
End: 2024-04-03
Payer: MEDICAID

## 2024-04-03 VITALS
HEART RATE: 86 BPM | BODY MASS INDEX: 28.92 KG/M2 | HEIGHT: 64 IN | DIASTOLIC BLOOD PRESSURE: 95 MMHG | SYSTOLIC BLOOD PRESSURE: 116 MMHG | WEIGHT: 169.4 LBS | OXYGEN SATURATION: 96 %

## 2024-04-03 DIAGNOSIS — I10 ESSENTIAL HYPERTENSION: ICD-10-CM

## 2024-04-03 DIAGNOSIS — E03.8 CENTRAL HYPOTHYROIDISM: Primary | ICD-10-CM

## 2024-04-03 DIAGNOSIS — E22.1 HYPERPROLACTINEMIA (HCC): ICD-10-CM

## 2024-04-03 PROCEDURE — 3075F SYST BP GE 130 - 139MM HG: CPT | Performed by: INTERNAL MEDICINE

## 2024-04-03 PROCEDURE — 3080F DIAST BP >= 90 MM HG: CPT | Performed by: INTERNAL MEDICINE

## 2024-04-03 PROCEDURE — 99214 OFFICE O/P EST MOD 30 MIN: CPT | Performed by: INTERNAL MEDICINE

## 2024-04-03 RX ORDER — LEVOTHYROXINE SODIUM 88 UG/1
TABLET ORAL
Qty: 90 TABLET | Refills: 1 | Status: SHIPPED | OUTPATIENT
Start: 2024-04-03

## 2024-04-03 RX ORDER — LISINOPRIL 2.5 MG/1
2.5 TABLET ORAL DAILY
COMMUNITY

## 2024-04-03 NOTE — PROGRESS NOTES
Patient ID: Brandi Casey is a 44 y.o. female    Chief Complaint: Hyperprolactinemia, pituitary microadenoma, central hypothyroidism      HPI:   Brandi Casey is here for an evaluation of hyperprolactinemia.     MRI Aug 2019: 3 mm nonenhancing nodular focus within the right half of the pituitary gland with associated focal contour abnormality, likely representing the clinically suspected prolactinoma.     Prolactin 23.6, Aug 2019.   Prolactin 18, at [facility] 11 Aug 2019. Other work up was okay.   Prolactin 15 Nov 2019.      She has breast discharge since giving birth in 2016.   She still has breast discharge  Materna GM had breast cancer.     She has PMS symptoms of headaches.   Mentsrual cycles are regular. S/p tubal ligation.   Denies Breast exam/Mammogram/ Nipple piercing      Denies drugs (heroine)   Denies family history of thyroid, parathyroid, pituitary, pancreatic tumors     Family history of thyroid disorder: Not known   No neck radiation    Interval history  Central hypothyroidism:    Takes Levothyroxine 75 mcg, liothyronine 5 mcg daily in morning empty stomach with water and waits for 30 minutes.     Energy levels good   Working out at home. Weight is down 2 lbs. Will play volInfluitive ball twice a week.     In summer (when really hot) she was having difficulty to catch breath. It happens when she is playing volley ball outside   Heat intolerance is better. Has temp sensitivity   Cycles were regular. S/p tubal ligation   Headaches sometimes, not as bad , those are PMS    No hair or skin changes   Mood is okay. Anxiety is better. (May be due to new job, she thinks).  Taking paroxetine   Denies palpitations, sleep issues (on sleeping aid- melatonin some days)    Has neck pain    No recent cold or fever   No OTC meds     The following portions of the patient's history were reviewed and updated as appropriate:       Family History   Problem Relation Age of Onset    Other Mother         Dermayostosis    Heart

## 2024-04-26 DIAGNOSIS — E03.8 CENTRAL HYPOTHYROIDISM: ICD-10-CM

## 2024-04-29 RX ORDER — LEVOTHYROXINE SODIUM 0.07 MG/1
TABLET ORAL
Qty: 30 TABLET | Refills: 0 | OUTPATIENT
Start: 2024-04-29

## 2024-05-20 DIAGNOSIS — E03.8 CENTRAL HYPOTHYROIDISM: ICD-10-CM

## 2024-05-20 RX ORDER — LEVOTHYROXINE SODIUM 0.07 MG/1
TABLET ORAL
Qty: 30 TABLET | Refills: 0 | OUTPATIENT
Start: 2024-05-20

## 2024-06-03 ENCOUNTER — TELEPHONE (OUTPATIENT)
Dept: ORTHOPEDIC SURGERY | Age: 44
End: 2024-06-03

## 2024-06-03 NOTE — TELEPHONE ENCOUNTER
Surgery and/or Procedure Scheduling     Contact Name: Brandi Casey   Surgical/Procedure Request: YOU HANDS   Patient Contact Number: [phone]     PATIENT CALLING REQUESTING A CALL BACK FROM SOMEONE IN DR ARIANA DAILEY OFFICE TO SCHEDULE SURGERY FOR HER HANDS     PLEASE CALL THE PATIENT BACK AT THE ABOVE NUMBER

## 2024-06-03 NOTE — TELEPHONE ENCOUNTER
Spoke with the patient, she would like to proceed with surgery.  Surgery letter and consent was put in chart.

## 2024-06-04 ENCOUNTER — PREP FOR PROCEDURE (OUTPATIENT)
Dept: ORTHOPEDIC SURGERY | Age: 44
End: 2024-06-04

## 2024-06-04 DIAGNOSIS — G56.01 RIGHT CARPAL TUNNEL SYNDROME: ICD-10-CM

## 2024-06-04 DIAGNOSIS — G56.02 LEFT CARPAL TUNNEL SYNDROME: ICD-10-CM

## 2024-06-10 ENCOUNTER — TELEPHONE (OUTPATIENT)
Dept: ORTHOPEDIC SURGERY | Age: 44
End: 2024-06-10

## 2024-06-11 ENCOUNTER — ANESTHESIA (OUTPATIENT)
Dept: OPERATING ROOM | Age: 44
End: 2024-06-11
Payer: MEDICAID

## 2024-06-11 ENCOUNTER — ANESTHESIA EVENT (OUTPATIENT)
Dept: OPERATING ROOM | Age: 44
End: 2024-06-11
Payer: MEDICAID

## 2024-06-11 ENCOUNTER — HOSPITAL ENCOUNTER (OUTPATIENT)
Age: 44
Setting detail: OUTPATIENT SURGERY
Discharge: HOME | End: 2024-06-11
Attending: ORTHOPAEDIC SURGERY | Admitting: ORTHOPAEDIC SURGERY
Payer: MEDICAID

## 2024-06-11 VITALS
WEIGHT: 160 LBS | OXYGEN SATURATION: 98 % | TEMPERATURE: 98 F | HEIGHT: 64 IN | DIASTOLIC BLOOD PRESSURE: 69 MMHG | RESPIRATION RATE: 16 BRPM | BODY MASS INDEX: 27.31 KG/M2 | SYSTOLIC BLOOD PRESSURE: 110 MMHG | HEART RATE: 74 BPM

## 2024-06-11 LAB — HCG UR QL: NEGATIVE

## 2024-06-11 PROCEDURE — 7100000010 HC PHASE II RECOVERY - FIRST 15 MIN: Performed by: ORTHOPAEDIC SURGERY

## 2024-06-11 PROCEDURE — A4217 STERILE WATER/SALINE, 500 ML: HCPCS | Performed by: ORTHOPAEDIC SURGERY

## 2024-06-11 PROCEDURE — 2709999900 HC NON-CHARGEABLE SUPPLY: Performed by: ORTHOPAEDIC SURGERY

## 2024-06-11 PROCEDURE — 84703 CHORIONIC GONADOTROPIN ASSAY: CPT

## 2024-06-11 PROCEDURE — 2500000003 HC RX 250 WO HCPCS: Performed by: NURSE ANESTHETIST, CERTIFIED REGISTERED

## 2024-06-11 PROCEDURE — 3700000000 HC ANESTHESIA ATTENDED CARE: Performed by: ORTHOPAEDIC SURGERY

## 2024-06-11 PROCEDURE — 2580000003 HC RX 258: Performed by: ORTHOPAEDIC SURGERY

## 2024-06-11 PROCEDURE — 6360000002 HC RX W HCPCS: Performed by: NURSE ANESTHETIST, CERTIFIED REGISTERED

## 2024-06-11 PROCEDURE — 3700000001 HC ADD 15 MINUTES (ANESTHESIA): Performed by: ORTHOPAEDIC SURGERY

## 2024-06-11 PROCEDURE — 3600000003 HC SURGERY LEVEL 3 BASE: Performed by: ORTHOPAEDIC SURGERY

## 2024-06-11 PROCEDURE — 6360000002 HC RX W HCPCS: Performed by: ORTHOPAEDIC SURGERY

## 2024-06-11 PROCEDURE — 2580000003 HC RX 258: Performed by: ANESTHESIOLOGY

## 2024-06-11 PROCEDURE — 2500000003 HC RX 250 WO HCPCS: Performed by: ORTHOPAEDIC SURGERY

## 2024-06-11 PROCEDURE — 3600000013 HC SURGERY LEVEL 3 ADDTL 15MIN: Performed by: ORTHOPAEDIC SURGERY

## 2024-06-11 PROCEDURE — 7100000011 HC PHASE II RECOVERY - ADDTL 15 MIN: Performed by: ORTHOPAEDIC SURGERY

## 2024-06-11 RX ORDER — MEPERIDINE HYDROCHLORIDE 50 MG/ML
12.5 INJECTION INTRAMUSCULAR; INTRAVENOUS; SUBCUTANEOUS EVERY 5 MIN PRN
Status: CANCELLED | OUTPATIENT
Start: 2024-06-11

## 2024-06-11 RX ORDER — SODIUM CHLORIDE 9 MG/ML
INJECTION, SOLUTION INTRAVENOUS PRN
Status: CANCELLED | OUTPATIENT
Start: 2024-06-11

## 2024-06-11 RX ORDER — ONDANSETRON 2 MG/ML
4 INJECTION INTRAMUSCULAR; INTRAVENOUS
Status: CANCELLED | OUTPATIENT
Start: 2024-06-11

## 2024-06-11 RX ORDER — LABETALOL HYDROCHLORIDE 5 MG/ML
5 INJECTION, SOLUTION INTRAVENOUS EVERY 10 MIN PRN
Status: CANCELLED | OUTPATIENT
Start: 2024-06-11

## 2024-06-11 RX ORDER — OXYCODONE HYDROCHLORIDE 5 MG/1
5 TABLET ORAL PRN
Status: CANCELLED | OUTPATIENT
Start: 2024-06-11 | End: 2024-06-11

## 2024-06-11 RX ORDER — MAGNESIUM HYDROXIDE 1200 MG/15ML
LIQUID ORAL CONTINUOUS PRN
Status: COMPLETED | OUTPATIENT
Start: 2024-06-11 | End: 2024-06-11

## 2024-06-11 RX ORDER — DIPHENHYDRAMINE HYDROCHLORIDE 50 MG/ML
12.5 INJECTION INTRAMUSCULAR; INTRAVENOUS
Status: CANCELLED | OUTPATIENT
Start: 2024-06-11 | End: 2024-06-12

## 2024-06-11 RX ORDER — LIDOCAINE HYDROCHLORIDE 20 MG/ML
INJECTION, SOLUTION EPIDURAL; INFILTRATION; INTRACAUDAL; PERINEURAL PRN
Status: DISCONTINUED | OUTPATIENT
Start: 2024-06-11 | End: 2024-06-11 | Stop reason: SDUPTHER

## 2024-06-11 RX ORDER — OXYCODONE HYDROCHLORIDE 5 MG/1
10 TABLET ORAL PRN
Status: CANCELLED | OUTPATIENT
Start: 2024-06-11 | End: 2024-06-11

## 2024-06-11 RX ORDER — PROPOFOL 10 MG/ML
INJECTION, EMULSION INTRAVENOUS PRN
Status: DISCONTINUED | OUTPATIENT
Start: 2024-06-11 | End: 2024-06-11 | Stop reason: SDUPTHER

## 2024-06-11 RX ORDER — SODIUM CHLORIDE 0.9 % (FLUSH) 0.9 %
5-40 SYRINGE (ML) INJECTION PRN
Status: CANCELLED | OUTPATIENT
Start: 2024-06-11

## 2024-06-11 RX ORDER — SODIUM CHLORIDE 0.9 % (FLUSH) 0.9 %
5-40 SYRINGE (ML) INJECTION PRN
Status: DISCONTINUED | OUTPATIENT
Start: 2024-06-11 | End: 2024-06-11 | Stop reason: HOSPADM

## 2024-06-11 RX ORDER — SODIUM CHLORIDE 0.9 % (FLUSH) 0.9 %
5-40 SYRINGE (ML) INJECTION EVERY 12 HOURS SCHEDULED
Status: CANCELLED | OUTPATIENT
Start: 2024-06-11

## 2024-06-11 RX ORDER — SODIUM CHLORIDE, SODIUM LACTATE, POTASSIUM CHLORIDE, CALCIUM CHLORIDE 600; 310; 30; 20 MG/100ML; MG/100ML; MG/100ML; MG/100ML
INJECTION, SOLUTION INTRAVENOUS CONTINUOUS
Status: DISCONTINUED | OUTPATIENT
Start: 2024-06-11 | End: 2024-06-11 | Stop reason: HOSPADM

## 2024-06-11 RX ORDER — SODIUM CHLORIDE 9 MG/ML
INJECTION, SOLUTION INTRAVENOUS PRN
Status: DISCONTINUED | OUTPATIENT
Start: 2024-06-11 | End: 2024-06-11 | Stop reason: HOSPADM

## 2024-06-11 RX ORDER — SODIUM CHLORIDE 0.9 % (FLUSH) 0.9 %
5-40 SYRINGE (ML) INJECTION EVERY 12 HOURS SCHEDULED
Status: DISCONTINUED | OUTPATIENT
Start: 2024-06-11 | End: 2024-06-11 | Stop reason: HOSPADM

## 2024-06-11 RX ORDER — NALOXONE HYDROCHLORIDE 0.4 MG/ML
INJECTION, SOLUTION INTRAMUSCULAR; INTRAVENOUS; SUBCUTANEOUS PRN
Status: CANCELLED | OUTPATIENT
Start: 2024-06-11

## 2024-06-11 RX ORDER — MIDAZOLAM HYDROCHLORIDE 1 MG/ML
2 INJECTION INTRAMUSCULAR; INTRAVENOUS
Status: DISCONTINUED | OUTPATIENT
Start: 2024-06-11 | End: 2024-06-11 | Stop reason: HOSPADM

## 2024-06-11 RX ADMIN — SODIUM CHLORIDE, POTASSIUM CHLORIDE, SODIUM LACTATE AND CALCIUM CHLORIDE: 600; 310; 30; 20 INJECTION, SOLUTION INTRAVENOUS at 06:45

## 2024-06-11 RX ADMIN — PROPOFOL 100 MG: 10 INJECTION, EMULSION INTRAVENOUS at 07:17

## 2024-06-11 RX ADMIN — LIDOCAINE HYDROCHLORIDE 60 MG: 20 INJECTION, SOLUTION EPIDURAL; INFILTRATION; INTRACAUDAL; PERINEURAL at 07:17

## 2024-06-11 RX ADMIN — PROPOFOL 60 MG: 10 INJECTION, EMULSION INTRAVENOUS at 07:21

## 2024-06-11 ASSESSMENT — PAIN SCALES - GENERAL: PAINLEVEL_OUTOF10: 0

## 2024-06-11 ASSESSMENT — PAIN - FUNCTIONAL ASSESSMENT: PAIN_FUNCTIONAL_ASSESSMENT: 0-10

## 2024-06-11 NOTE — ANESTHESIA PRE PROCEDURE
Department of Anesthesiology  Preprocedure Note       Name:  Brandi Casey   Age:  44 y.o.  :  1980                                          MRN:  [id number]         Date:  2024      Surgeon: Surgeon(s):  Joel Bynum MD    Procedure: Procedure(s):  LEFT CARPAL TUNNEL RELEASE    Medications prior to admission:   Prior to Admission medications    Medication Sig Start Date End Date Taking? Authorizing Provider   lisinopril (PRINIVIL;ZESTRIL) 2.5 MG tablet Take 1 tablet by mouth daily    Balbir Lyle MD   levothyroxine (SYNTHROID) 88 MCG tablet TAKE 1 TABLET BY MOUTH EVERY MORNING 4/3/24   Mahesh Magallanes MD   liothyronine (CYTOMEL) 5 MCG tablet Take 1 tablet by mouth daily 23  Mahesh Magallanes MD   hydroCHLOROthiazide (HYDRODIURIL) 25 MG tablet TAKE 1 TABLET BY MOUTH EVERY DAY 22   Mahesh Magallanes MD   methocarbamol (ROBAXIN) 500 MG tablet Take 1 tablet by mouth as needed 22   Balbir Lyle MD   PARoxetine (PAXIL) 30 MG tablet Take 1 tablet by mouth every morning 21   ProviderBalbir MD       Current medications:    Current Facility-Administered Medications   Medication Dose Route Frequency Provider Last Rate Last Admin   • lactated ringers IV soln infusion   IntraVENous Continuous Eleazar Gan  mL/hr at 24 0645 New Bag at 24 0645   • sodium chloride flush 0.9 % injection 5-40 mL  5-40 mL IntraVENous 2 times per day Eleazar Gan MD       • sodium chloride flush 0.9 % injection 5-40 mL  5-40 mL IntraVENous PRN Eleazar Gan MD       • 0.9 % sodium chloride infusion   IntraVENous PRN Eleazar Gan MD       • midazolam (VERSED) injection 2 mg  2 mg IntraVENous Once PRN Eleazar Gan MD           Allergies:    Allergies   Allergen Reactions   • Penicillins Other (See Comments)     Other reaction(s): Headache  Has only taken in one time and got a headache         Problem List:    Patient Active Problem

## 2024-06-11 NOTE — ANESTHESIA POSTPROCEDURE EVALUATION
Department of Anesthesiology  Postprocedure Note    Patient: Brandi Casey  MRN: [id number]  YOB: 1980  Date of evaluation: 6/11/2024    Procedure Summary       Date: 06/11/24 Room / Location: [address]    Anesthesia Start: 0713 Anesthesia Stop: 0728    Procedure: LEFT CARPAL TUNNEL RELEASE (Left: Wrist) Diagnosis:       Left carpal tunnel syndrome      (Left carpal tunnel syndrome [G56.02])    Surgeons: Joel Bynum MD Responsible Provider: Morgan Pickering MD    Anesthesia Type: MAC ASA Status: 2            Anesthesia Type: No value filed.    Judy Phase I: Judy Score: 10    Judy Phase II: Judy Score: 10    Anesthesia Post Evaluation    Patient location during evaluation: PACU  Patient participation: complete - patient participated  Level of consciousness: awake and alert  Pain score: 0  Airway patency: patent  Nausea & Vomiting: no nausea and no vomiting  Cardiovascular status: blood pressure returned to baseline  Respiratory status: acceptable  Hydration status: stable  Pain management: adequate    No notable events documented.

## 2024-06-11 NOTE — PROGRESS NOTES
Date and time of surgery : 6/04/24             Arrival Time:  0600     Bring Picture ID and insurance card.  Please wear simple, loose fitting clothing to the hospital.   Do not bring valuables (money, credit cards, checkbooks, etc.)   Do not wear any makeup (including  eye makeup) and no nail polish or artificial nails on your fingers or toes.  DO NOT wear any jewelry or piercings on day of surgery.  All body piercing jewelry must be removed.  If you have dentures, they will be removed before going to the OR; we will provide you a container.  If you wear contact lenses or glasses, they will be removed; please bring a case for them.  Shower the evening before or morning of surgery with antibacterial soap.  Nothing to eat or drink after midnight the day before surgery.   You may brush your teeth and gargle the morning of surgery.  DO NOT SWALLOW WATER.   Do not take any morning meds the day of your surgery.  Aspirin, Ibuprofen, Advil, Naproxen, Vitamin E and other Anti-inflammatory products and supplements should be stopped for 5 -7days before surgery or as directed by your physician.  Do not smoke or drink any alcoholic beverages 24 hours prior to surgery.  This includes NA Beer. Refrain from the usage of any recreational drugs, including non-prescribed prescription drugs.   You MUST plan for a responsible adult to stay on site while you are here and take you home after your surgery. You will not be allowed to leave alone or drive yourself home. It is strongly suggested someone stay with you the first 24 hrs. Your surgery will be cancelled if you do not have a ride home.  To help prevent infection, change your sheets the night before surgery.   If you  have a Living Will and Durable Power of  for Healthcare, please bring in a copy.  Notify your Surgeon if you develop any illness between now and time of surgery. Cough, cold, fever, sore throat, nausea, vomiting, etc.  Please notify your surgeon if you 
Awake and alert with no complaints. VS stable. Discharge instructions reviewed with patient/responsible adult and understanding verbalized. Discharge instructions copies given. Discharge criteria met per hospital policy. Patient discharged home with belongings.     
Pt arrived to pacu from sx.  vSS.  Pt drowsy but arosuable  
_______SCD                      ______RUBY NIEVES    COVID + _______DATE    ___OK per Anesthesia   ____OK per Surgeon

## 2024-06-11 NOTE — H&P
Pre-operative Update of H&P:    I  have seen & examined Ms. Brandi Casey related solely to her hand and upper extremity conditions, prior to the scheduled procedure on the date of her surgery.  The indications for the planned surgical procedure & and her upper-extremity condition are unchanged.

## 2024-06-11 NOTE — OP NOTE
OPERATIVE REPORT          Patient:  Brandi Casey    YOB: 1980  Date of Service:  6/11/2024   Location:  [facility]    Preoperative Diagnosis:    Left carpal tunnel syndrome    Postoperative Diagnosis:    Same    Procedure:    Left carpal tunnel release      Surgeon:    Joel Bynum MD    Surgical Assistant:    Hospital Supplied Assistant    Anesthesia:   Local with Sedation    Blood Loss:   Minimal    Complications:  None    Tourniquet Time: 2 minutes     Indications:  Ms. Brandi Casey  is a 44 y.o. year-old female with Left carpal tunnel syndrome. I have discussed preoperatively with her  the complications, limitations, expectations, alternatives and risks of surgical care, which she has understood.  All of her questions have been fully answered, and she has provided written informed consent to proceed.    Procedure:   After written consent was obtained and the proper operative site was identified and marked, Ms. Brandi Casey was brought to the operating room, placed in the supine position on the operating room table with the Left arm extended upon a hand table.  Under an appropriate level of sedation, local anesthetic (1% Lidocaine and 1/2% Marcaine both without Epinephrine) was instilled in the planned surgical field. Her Left upper extremity was prepped and draped in the usual sterile fashion.     After Esmarch exsanguination, the pneumotourniquet was inflated to 250 mm of mercury.   A 2 cm longitudinal incision was fashioned at the base of the palm, paralleling the longitudinal thenar crease. Dissection was carried carefully through the subcutaneous tissue identifying and protecting the neurovascular structures. The palmar fascia was incised longitudinally, exposing the transverse carpal ligament. The transverse carpal ligament was incised from its proximal to distal most extent, under direct visualization. The terminal 2 cm of antebrachial fascia was similarly incised

## 2024-06-11 NOTE — DISCHARGE INSTRUCTIONS
What is a Surgical Site Infection or  (SSI)?        A surgical site infection (SSI) is an infection that occurs after surgery in the part of the body where the surgery took place. Most patients who have surgery do not develop an infection. However, infections can develop in about 1-3 cases for every 100 patients who have had surgery.  Our goal is for you to NOT experience any complications and be completely satisfied with your care!    However, some signs or symptoms to look for and report immediately to your doctor are:   1. Fever above 101 degrees    2. Redness and increasing pain around the area  where you had surgery   3. Drainage of cloudy fluid or pus coming from the surgical area    Some of the things we/ you can do to prevent SSI's are:   1. Clean hands with soap and water or an alcohol-based hand rub before and after caring for the operative area. This occurs the day of surgery and for the next 2 weeks.   2.Sometimes you receive an appropriate antibiotic within 60 minutes before your surgery or take one for several days after surgery depending on your surgeon's instructions and/or the type of surgery you are having.    3. Family and/or friends who visit you should NOT touch the surgical wound or dressings until advised by your surgeon.    4. Be sure to elevate and decrease the swelling after your surgery to help prevent infection.   5. If you are a diabetic, you need to closely monitor your blood sugar levels and report any significant increases or changes to your surgeon to help promote the healing process.What is a Surgical Site Infection or  (SSI)?        A surgical site infection (SSI) is an infection that occurs after surgery in the part of the body where the surgery took place. Most patients who have surgery do not develop an infection. However, infections can develop in about 1-3 cases for every 100 patients who have had surgery.  Our goal is for you to NOT experience any complications and be

## 2024-06-12 ENCOUNTER — TELEPHONE (OUTPATIENT)
Dept: ORTHOPEDIC SURGERY | Age: 44
End: 2024-06-12

## 2024-06-26 ENCOUNTER — OFFICE VISIT (OUTPATIENT)
Dept: ORTHOPEDIC SURGERY | Age: 44
End: 2024-06-26

## 2024-06-26 VITALS — BODY MASS INDEX: 27.31 KG/M2 | WEIGHT: 160 LBS | HEIGHT: 64 IN | RESPIRATION RATE: 16 BRPM

## 2024-06-26 DIAGNOSIS — Z98.890 STATUS POST CARPAL TUNNEL RELEASE: Primary | ICD-10-CM

## 2024-06-26 PROCEDURE — 99024 POSTOP FOLLOW-UP VISIT: CPT | Performed by: ORTHOPAEDIC SURGERY

## 2024-06-26 NOTE — PROGRESS NOTES
Ms. Brandi Casey returns today in follow-up of her recent left Carpal Tunnel Release done approximately 2 weeks ago.  She has done well noting mild discomfort and no other reported complications.    She notes pre-operative symptoms to be completely resolved at this time.    Physical Exam:  Bandage intact and well cared for   Skin incision is healing well, without erythema, drainage or sign of infection.  Digital range of motion is without significant limitation.  Wrist range of motion is without significant limitation.  Sensation is significantly improved from preoperatvely  Vascular examination reveals normal, good capillary refill, and good color.  Swelling is minimal.  Sensory and Motor Median Nerve function is intact.    Impression:  Ms. Brandi Casey is doing well after recent left Carpal Tunnel Release.    Plan:  Ms. Brandi Casey is instructed in work on Active & Passive range of motion of the digits, wrist, & elbow.  These modalities were specifically demonstrated to her today.  We discussed the appropriateness of gradual resumption of use of the operated hand and the return to normal use as comfort allows.  She is given instructions regarding management of the fresh surgical incision and progressive use of desensitization and tissue massage techniques.  We discussed the appropriate expectations and timeline for symptom improvement.    She is provided a written patient instruction sheet titled: Postoperative Instructions After Carpal Tunnel Release.    I have asked Ms. Brandi Casey to follow-up with me or contact me by telephone over the next 2-4 weeks if her symptoms have not fully resolved or if she has not regained full & painless return of function.      She is also specifically instructed to return to the office or call for an appointment sooner if her symptoms are changing or worsening prior to that time.

## 2024-06-26 NOTE — PATIENT INSTRUCTIONS
Postoperative Instructions After Carpal Tunnel Release    Dr. Joel Bynum        After bandages are removed one week from surgery, you may chose to wear a small bandage over the incision if you wish, though you do not need to.  Keep incision dry until sutures have fully dissolved  or it has been 12 - 14 days since your surgery. Thereafter, you may wash with mild soap and water and shower normally.    Work hard on motion of the fingers and wrist, straightening each finger fully and bending each finger fully, bending wrist forward and bending wrist backwards. Do not be concerned if you experience discomfort.  This will not damage the surgery.  You may begin using the hand as it feels comfortable beginning 12 - 14 days from the day of surgery. You may not feel entirely comfortable gripping or lifting heavy objects for several weeks.  You may expect to see some skin “peel” off around the incision.  You may be left with a small area of “pink baby skin”. This is quite normal.  6. Once your stiches have fully disappeared & skin appears normal, you should begin gently massaging the incision with Vitamin E (may use Vitamin E lotion or contents of Vitamin E capsule).      Thank you for choosing [facility] for your Hand and Upper Extremity needs.  If we can be of any further assistance to you, please do not hesitate to contact us.    Office Phone Number:  [phone]  or  [phone]

## 2024-07-11 LAB
PROLACTIN: 11.8 NG/ML (ref 3.3–26.7)
T3 FREE: 3.3 PG/ML (ref 2.5–3.9)
T4 FREE: 0.85 NG/DL (ref 0.61–1.12)
TSH SERPL DL<=0.05 MIU/L-ACNC: 0.01 UIU/ML (ref 0.45–5.33)

## 2024-07-12 LAB — T3 TOTAL: 111 NG/DL (ref 71–180)

## 2024-07-17 ENCOUNTER — OFFICE VISIT (OUTPATIENT)
Dept: ENDOCRINOLOGY | Age: 44
End: 2024-07-17
Payer: MEDICAID

## 2024-07-17 VITALS
WEIGHT: 166 LBS | HEART RATE: 90 BPM | BODY MASS INDEX: 28.34 KG/M2 | DIASTOLIC BLOOD PRESSURE: 88 MMHG | TEMPERATURE: 98 F | HEIGHT: 64 IN | SYSTOLIC BLOOD PRESSURE: 119 MMHG | RESPIRATION RATE: 14 BRPM

## 2024-07-17 DIAGNOSIS — E03.8 CENTRAL HYPOTHYROIDISM: Primary | ICD-10-CM

## 2024-07-17 DIAGNOSIS — I10 ESSENTIAL HYPERTENSION: ICD-10-CM

## 2024-07-17 DIAGNOSIS — E22.1 HYPERPROLACTINEMIA (HCC): ICD-10-CM

## 2024-07-17 PROCEDURE — 3074F SYST BP LT 130 MM HG: CPT | Performed by: INTERNAL MEDICINE

## 2024-07-17 PROCEDURE — 99214 OFFICE O/P EST MOD 30 MIN: CPT | Performed by: INTERNAL MEDICINE

## 2024-07-17 PROCEDURE — 3079F DIAST BP 80-89 MM HG: CPT | Performed by: INTERNAL MEDICINE

## 2024-07-17 RX ORDER — LIOTHYRONINE SODIUM 5 UG/1
5 TABLET ORAL DAILY
Qty: 90 TABLET | Refills: 3 | Status: SHIPPED | OUTPATIENT
Start: 2024-07-17 | End: 2025-07-17

## 2024-07-17 RX ORDER — LEVOTHYROXINE SODIUM 88 UG/1
TABLET ORAL
Qty: 90 TABLET | Refills: 3 | Status: SHIPPED | OUTPATIENT
Start: 2024-07-17

## 2024-07-17 NOTE — PROGRESS NOTES
behavior is normal.     Lab Review:    Orders Only on 07/11/2024   Component Date Value Ref Range Status    TSH 07/11/2024 0.014 (A)  0.450 - 5.330 uIU/mL Final    T3, Free 07/11/2024 3.3  2.5 - 3.9 pg/mL Final    T4 Free 07/11/2024 0.85  0.61 - 1.12 ng/dL Final    Prolactin 07/11/2024 11.8  3.3 - 26.7 ng/mL Final    T3, Total 07/11/2024 111  71 - 180 ng/dL Final   Orders Only on 07/11/2024   Component Date Value Ref Range Status    Phosphorus 07/11/2024 3.7  2.5 - 5.0 mg/dL Final    Sodium 07/11/2024 138  136 - 145 mmol/L Final    Potassium 07/11/2024 3.8  3.5 - 5.1 mmol/L Final    Chloride 07/11/2024 102  98 - 107 mmol/L Final    CO2 07/11/2024 27  21 - 31 mmol/L Final    Anion Gap 07/11/2024 9  7 - 16 mmol/L Final    Glucose 07/11/2024 79  74 - 109 mg/dL Final    BUN 07/11/2024 13  7 - 25 mg/dL Final    Creatinine 07/11/2024 0.64  0.6 - 1.2 mg/dL Final    Calcium 07/11/2024 9.4  8.6 - 10.2 mg/dL Final    eGFR Female 07/11/2024 >90  >90 mL/min/1.73m2 Final    Cholesterol, Total 07/11/2024 184  <200 mg/dL Final    Triglycerides 07/11/2024 89  <150 mg/dL Final    HDL 07/11/2024 56  40 - 60 mg/dL Final    LDL Cholesterol 07/11/2024 110 (A)  0 - 99 mg/dL Final    VLDL 07/11/2024 18  0 - 40 mg/dl Final    Iron 07/11/2024 68  50 - 170 ug/dL Final    Iron % Saturation 07/11/2024 20  20 - 40 % Final    UIBC 07/11/2024 275  155 - 355 ug/dL Final    TIBC 07/11/2024 343  250 - 450 ug/dL Final    Total Protein 07/11/2024 6.8  6.0 - 8.3 g/dL Final    Albumin 07/11/2024 3.7  3.5 - 5.7 g/dL Final    Alkaline Phosphatase 07/11/2024 49  34 - 104 U/L Final    AST 07/11/2024 14  13 - 39 U/L Final    ALT 07/11/2024 10  7 - 52 U/L Final    Total Bilirubin 07/11/2024 0.7  0.3 - 1.0 mg/dl Final    Bilirubin, Direct 07/11/2024 0.16  <=0.20 mg/dL Final   Admission on 06/11/2024, Discharged on 06/11/2024   Component Date Value Ref Range Status    Pregnancy, Urine 06/11/2024 Negative  Detects HCG level >20 MIU/mL Final   Orders Only on

## 2024-07-23 RX ORDER — CARVEDILOL 3.12 MG/1
3.12 TABLET ORAL 2 TIMES DAILY WITH MEALS
COMMUNITY

## 2024-07-30 ENCOUNTER — ANESTHESIA (OUTPATIENT)
Dept: OPERATING ROOM | Age: 44
End: 2024-07-30
Payer: MEDICAID

## 2024-07-30 ENCOUNTER — ANESTHESIA EVENT (OUTPATIENT)
Dept: OPERATING ROOM | Age: 44
End: 2024-07-30
Payer: MEDICAID

## 2024-07-30 ENCOUNTER — HOSPITAL ENCOUNTER (OUTPATIENT)
Age: 44
Setting detail: OUTPATIENT SURGERY
Discharge: HOME | End: 2024-07-30
Attending: ORTHOPAEDIC SURGERY | Admitting: ORTHOPAEDIC SURGERY
Payer: MEDICAID

## 2024-07-30 VITALS
RESPIRATION RATE: 16 BRPM | BODY MASS INDEX: 27.31 KG/M2 | SYSTOLIC BLOOD PRESSURE: 116 MMHG | HEIGHT: 64 IN | HEART RATE: 64 BPM | WEIGHT: 160 LBS | TEMPERATURE: 98 F | OXYGEN SATURATION: 99 % | DIASTOLIC BLOOD PRESSURE: 78 MMHG

## 2024-07-30 LAB — HCG UR QL: NEGATIVE

## 2024-07-30 PROCEDURE — 2709999900 HC NON-CHARGEABLE SUPPLY: Performed by: ORTHOPAEDIC SURGERY

## 2024-07-30 PROCEDURE — 3700000001 HC ADD 15 MINUTES (ANESTHESIA): Performed by: ORTHOPAEDIC SURGERY

## 2024-07-30 PROCEDURE — 3700000000 HC ANESTHESIA ATTENDED CARE: Performed by: ORTHOPAEDIC SURGERY

## 2024-07-30 PROCEDURE — 6360000002 HC RX W HCPCS: Performed by: ORTHOPAEDIC SURGERY

## 2024-07-30 PROCEDURE — 84703 CHORIONIC GONADOTROPIN ASSAY: CPT

## 2024-07-30 PROCEDURE — 2580000003 HC RX 258: Performed by: ANESTHESIOLOGY

## 2024-07-30 PROCEDURE — 2500000003 HC RX 250 WO HCPCS: Performed by: NURSE ANESTHETIST, CERTIFIED REGISTERED

## 2024-07-30 PROCEDURE — 2580000003 HC RX 258: Performed by: ORTHOPAEDIC SURGERY

## 2024-07-30 PROCEDURE — 7100000010 HC PHASE II RECOVERY - FIRST 15 MIN: Performed by: ORTHOPAEDIC SURGERY

## 2024-07-30 PROCEDURE — 2500000003 HC RX 250 WO HCPCS: Performed by: ORTHOPAEDIC SURGERY

## 2024-07-30 PROCEDURE — 3600000013 HC SURGERY LEVEL 3 ADDTL 15MIN: Performed by: ORTHOPAEDIC SURGERY

## 2024-07-30 PROCEDURE — 3600000003 HC SURGERY LEVEL 3 BASE: Performed by: ORTHOPAEDIC SURGERY

## 2024-07-30 PROCEDURE — A4217 STERILE WATER/SALINE, 500 ML: HCPCS | Performed by: ORTHOPAEDIC SURGERY

## 2024-07-30 PROCEDURE — 6360000002 HC RX W HCPCS: Performed by: NURSE ANESTHETIST, CERTIFIED REGISTERED

## 2024-07-30 PROCEDURE — 7100000011 HC PHASE II RECOVERY - ADDTL 15 MIN: Performed by: ORTHOPAEDIC SURGERY

## 2024-07-30 RX ORDER — MAGNESIUM HYDROXIDE 1200 MG/15ML
LIQUID ORAL CONTINUOUS PRN
Status: COMPLETED | OUTPATIENT
Start: 2024-07-30 | End: 2024-07-30

## 2024-07-30 RX ORDER — OXYCODONE HYDROCHLORIDE 5 MG/1
5 TABLET ORAL PRN
Status: CANCELLED | OUTPATIENT
Start: 2024-07-30 | End: 2024-07-30

## 2024-07-30 RX ORDER — LIDOCAINE HYDROCHLORIDE 20 MG/ML
INJECTION, SOLUTION INFILTRATION; PERINEURAL PRN
Status: DISCONTINUED | OUTPATIENT
Start: 2024-07-30 | End: 2024-07-30 | Stop reason: SDUPTHER

## 2024-07-30 RX ORDER — MIDAZOLAM HYDROCHLORIDE 1 MG/ML
2 INJECTION INTRAMUSCULAR; INTRAVENOUS
Status: DISCONTINUED | OUTPATIENT
Start: 2024-07-30 | End: 2024-07-30 | Stop reason: HOSPADM

## 2024-07-30 RX ORDER — SODIUM CHLORIDE 9 MG/ML
INJECTION, SOLUTION INTRAVENOUS PRN
Status: CANCELLED | OUTPATIENT
Start: 2024-07-30

## 2024-07-30 RX ORDER — DIPHENHYDRAMINE HYDROCHLORIDE 50 MG/ML
12.5 INJECTION INTRAMUSCULAR; INTRAVENOUS
Status: CANCELLED | OUTPATIENT
Start: 2024-07-30 | End: 2024-07-31

## 2024-07-30 RX ORDER — LABETALOL HYDROCHLORIDE 5 MG/ML
10 INJECTION, SOLUTION INTRAVENOUS
Status: CANCELLED | OUTPATIENT
Start: 2024-07-30

## 2024-07-30 RX ORDER — SODIUM CHLORIDE 0.9 % (FLUSH) 0.9 %
5-40 SYRINGE (ML) INJECTION PRN
Status: CANCELLED | OUTPATIENT
Start: 2024-07-30

## 2024-07-30 RX ORDER — SODIUM CHLORIDE 0.9 % (FLUSH) 0.9 %
5-40 SYRINGE (ML) INJECTION EVERY 12 HOURS SCHEDULED
Status: CANCELLED | OUTPATIENT
Start: 2024-07-30

## 2024-07-30 RX ORDER — NALOXONE HYDROCHLORIDE 0.4 MG/ML
INJECTION, SOLUTION INTRAMUSCULAR; INTRAVENOUS; SUBCUTANEOUS PRN
Status: CANCELLED | OUTPATIENT
Start: 2024-07-30

## 2024-07-30 RX ORDER — SODIUM CHLORIDE 0.9 % (FLUSH) 0.9 %
5-40 SYRINGE (ML) INJECTION EVERY 12 HOURS SCHEDULED
Status: DISCONTINUED | OUTPATIENT
Start: 2024-07-30 | End: 2024-07-30 | Stop reason: HOSPADM

## 2024-07-30 RX ORDER — ONDANSETRON 2 MG/ML
4 INJECTION INTRAMUSCULAR; INTRAVENOUS
Status: CANCELLED | OUTPATIENT
Start: 2024-07-30 | End: 2024-07-31

## 2024-07-30 RX ORDER — MEPERIDINE HYDROCHLORIDE 50 MG/ML
12.5 INJECTION INTRAMUSCULAR; INTRAVENOUS; SUBCUTANEOUS EVERY 5 MIN PRN
Status: CANCELLED | OUTPATIENT
Start: 2024-07-30

## 2024-07-30 RX ORDER — ONDANSETRON 2 MG/ML
INJECTION INTRAMUSCULAR; INTRAVENOUS PRN
Status: DISCONTINUED | OUTPATIENT
Start: 2024-07-30 | End: 2024-07-30 | Stop reason: SDUPTHER

## 2024-07-30 RX ORDER — SODIUM CHLORIDE 0.9 % (FLUSH) 0.9 %
5-40 SYRINGE (ML) INJECTION PRN
Status: DISCONTINUED | OUTPATIENT
Start: 2024-07-30 | End: 2024-07-30 | Stop reason: HOSPADM

## 2024-07-30 RX ORDER — OXYCODONE HYDROCHLORIDE 5 MG/1
10 TABLET ORAL PRN
Status: CANCELLED | OUTPATIENT
Start: 2024-07-30 | End: 2024-07-30

## 2024-07-30 RX ORDER — PROPOFOL 10 MG/ML
INJECTION, EMULSION INTRAVENOUS PRN
Status: DISCONTINUED | OUTPATIENT
Start: 2024-07-30 | End: 2024-07-30 | Stop reason: SDUPTHER

## 2024-07-30 RX ORDER — SODIUM CHLORIDE, SODIUM LACTATE, POTASSIUM CHLORIDE, CALCIUM CHLORIDE 600; 310; 30; 20 MG/100ML; MG/100ML; MG/100ML; MG/100ML
INJECTION, SOLUTION INTRAVENOUS CONTINUOUS
Status: DISCONTINUED | OUTPATIENT
Start: 2024-07-30 | End: 2024-07-30 | Stop reason: HOSPADM

## 2024-07-30 RX ORDER — SODIUM CHLORIDE 9 MG/ML
INJECTION, SOLUTION INTRAVENOUS PRN
Status: DISCONTINUED | OUTPATIENT
Start: 2024-07-30 | End: 2024-07-30 | Stop reason: HOSPADM

## 2024-07-30 RX ADMIN — PROPOFOL 80 MG: 10 INJECTION, EMULSION INTRAVENOUS at 08:15

## 2024-07-30 RX ADMIN — PROPOFOL 50 MG: 10 INJECTION, EMULSION INTRAVENOUS at 08:19

## 2024-07-30 RX ADMIN — ONDANSETRON 4 MG: 2 INJECTION INTRAMUSCULAR; INTRAVENOUS at 08:17

## 2024-07-30 RX ADMIN — LIDOCAINE HYDROCHLORIDE 60 MG: 20 INJECTION, SOLUTION INFILTRATION; PERINEURAL at 08:15

## 2024-07-30 RX ADMIN — SODIUM CHLORIDE, SODIUM LACTATE, POTASSIUM CHLORIDE, AND CALCIUM CHLORIDE: .6; .31; .03; .02 INJECTION, SOLUTION INTRAVENOUS at 08:13

## 2024-07-30 ASSESSMENT — PAIN - FUNCTIONAL ASSESSMENT: PAIN_FUNCTIONAL_ASSESSMENT: 0-10

## 2024-07-30 ASSESSMENT — PAIN SCALES - GENERAL
PAINLEVEL_OUTOF10: 0
PAINLEVEL_OUTOF10: 0

## 2024-07-30 NOTE — ANESTHESIA PRE PROCEDURE
Department of Anesthesiology  Preprocedure Note       Name:  Brandi Casey   Age:  44 y.o.  :  1980                                          MRN:  [id number]         Date:  2024      Surgeon: Surgeon(s):  Joel Bynum MD    Procedure: Procedure(s):  RIGHT CARPAL TUNNEL RELEASE    Medications prior to admission:   Prior to Admission medications    Medication Sig Start Date End Date Taking? Authorizing Provider   carvedilol (COREG) 3.125 MG tablet Take 1 tablet by mouth 2 times daily (with meals)   Yes Balbir Lyle MD   levothyroxine (SYNTHROID) 88 MCG tablet TAKE 1 TABLET BY MOUTH EVERY MORNING 24   Mahesh Magallanes MD   liothyronine (CYTOMEL) 5 MCG tablet Take 1 tablet by mouth daily 24  Mahesh Magallanes MD   lisinopril (PRINIVIL;ZESTRIL) 2.5 MG tablet Take 1 tablet by mouth daily    Balbir Lyle MD   hydroCHLOROthiazide (HYDRODIURIL) 25 MG tablet TAKE 1 TABLET BY MOUTH EVERY DAY 22   Mahesh Magallanes MD   methocarbamol (ROBAXIN) 500 MG tablet Take 1 tablet by mouth as needed 22   Balbir Lyle MD   PARoxetine (PAXIL) 30 MG tablet Take 1 tablet by mouth every morning 21   Balbir Lyle MD       Current medications:    Current Facility-Administered Medications   Medication Dose Route Frequency Provider Last Rate Last Admin    lactated ringers IV soln infusion   IntraVENous Continuous Eleazar Gan MD        sodium chloride flush 0.9 % injection 5-40 mL  5-40 mL IntraVENous 2 times per day Eleazar Gan MD        sodium chloride flush 0.9 % injection 5-40 mL  5-40 mL IntraVENous PRN Eleazar Gan MD        0.9 % sodium chloride infusion   IntraVENous PRN Eleazar Gan MD        midazolam (VERSED) injection 2 mg  2 mg IntraVENous Once PRN Eleazar Gan MD           Allergies:    Allergies   Allergen Reactions    Penicillins Other (See Comments)     Other reaction(s): Headache  Has only taken in one time

## 2024-07-30 NOTE — DISCHARGE INSTRUCTIONS
office staff may NOT accept any medications to drop off in the cabinet for you.   What is a Surgical Site Infection or  (SSI)?        A surgical site infection (SSI) is an infection that occurs after surgery in the part of the body where the surgery took place. Most patients who have surgery do not develop an infection. However, infections can develop in about 1-3 cases for every 100 patients who have had surgery.  Our goal is for you to NOT experience any complications and be completely satisfied with your care!    However, some signs or symptoms to look for and report immediately to your doctor are:   1. Fever above 101 degrees    2. Redness and increasing pain around the area  where you had surgery   3. Drainage of cloudy fluid or pus coming from the surgical area    Some of the things we/ you can do to prevent SSI's are:   1. Clean hands with soap and water or an alcohol-based hand rub before and after caring for the operative area. This occurs the day of surgery and for the next 2 weeks.   2.Sometimes you receive an appropriate antibiotic within 60 minutes before your surgery or take one for several days after surgery depending on your surgeon's instructions and/or the type of surgery you are having.    3. Family and/or friends who visit you should NOT touch the surgical wound or dressings until advised by your surgeon.    4. Be sure to elevate and decrease the swelling after your surgery to help prevent infection.   5. If you are a diabetic, you need to closely monitor your blood sugar levels and report any significant increases or changes to your surgeon to help promote the healing process.

## 2024-07-30 NOTE — PROGRESS NOTES
Awake and alert with no complaints. VS stable. Discharge instructions reviewed with patient/responsible adult and understanding verbalized. Discharge instructions copies given. Discharge criteria met per hospital policy. Patient discharged home with belongings.     
Brandi J Carmela    Age 44 y.o.    female    1980    MRN [id number]    7/30/2024  Arrival Time_____________  OR Time____________25 Min     Procedure(s):  RIGHT CARPAL TUNNEL RELEASE                      Monitor Anesthesia Care    Surgeon(s):  Joel Bynum, MD       Phone [phone] (home)     Initals  Date  Info Source  Home  Cell         Work  _____________________________________________________________________  _____________________________________________________________________  _____________________________________________________________________  _____________________________________________________________________  _____________________________________________________________________    PCP _____________________________ Phone_________________     H&P  ________________  Bringing      Chart              Epic      DOS      Called________  EKG ________________   Bringing      Chart              Epic      DOS      Called________  LABS________________   Bringing     Chart              Epic      DOS      Called________  Cardiac Clearance ______ Bringing      Chart              Epic      DOS      Called________  Pulmonary Clearance____ Bringing      Chart              Epic      DOS      Called________    Cardiologist________________________ Phone___________________________  Pulmonologist_______________________Phone___________________________    ? Advance Directives   ? Restoration concerns / Waiver on Chart            PAT Communications________________  ? Pre-op Instructions Given /Understood          _________________________________  ? Directions to Surgery Center                          _________________________________  ? Transportation Home_______________      __________________________________  ? Crutches/Walker__________________        __________________________________    Orders: Hard copy/ EPIC                 Transcribed/ EPIC              _______Wt.    ________Pharmacy                         
Pt admitted to pre-op. VSS IV inserted in left FA. Consents reviewed.Friend in waiting room  
Pt arrived to pacu from sx.  vSS.  Pt drowsy but arosuable  
surgery. Cough, cold, fever, sore throat, nausea, vomiting, etc.  Please notify your surgeon if you experience dizziness, shortness of breath or blurred vision between now & the time of your surgery  To provide excellent care visitors will be limited to two per room at any given time. No visitors under the age of 14.  If you use oxygen and have a portable tank please bring it with you the DOS  For your convenience [facility] has a pharmacy on site to fill your prescriptions.     *Please call pre admission testing if you have any further questions             Nate      [phone]                            Address: [address] [place] [address]     When you pull into the hospital and are looking at the main hospital entrance, turn right.   We are a tan building to the right of the main entrance.   [facility] over the door.  .   .                                                         Revision History

## 2024-07-30 NOTE — OP NOTE
OPERATIVE REPORT          Patient:  Brandi Casey    YOB: 1980  Date of Service:  7/30/2024   Location:  [facility]    Preoperative Diagnosis:    Right carpal tunnel syndrome    Postoperative Diagnosis:    Same    Procedure:    Right carpal tunnel release      Surgeon:    Joel Bynum MD    Surgical Assistant:    Hospital Supplied Assistant    Anesthesia:   Local with Sedation    Blood Loss:   Minimal    Complications:  None    Tourniquet Time: 2 minutes     Indications:  Ms. Brandi Casey  is a 44 y.o. year-old female with Right carpal tunnel syndrome. I have discussed preoperatively with her  the complications, limitations, expectations, alternatives and risks of surgical care, which she has understood.  All of her questions have been fully answered, and she has provided written informed consent to proceed.    Procedure:   After written consent was obtained and the proper operative site was identified and marked, Ms. Brandi Casey was brought to the operating room, placed in the supine position on the operating room table with the Right arm extended upon a hand table.  Under an appropriate level of sedation, local anesthetic (1% Lidocaine and 1/2% Marcaine both without Epinephrine) was instilled in the planned surgical field. Her Right upper extremity was prepped and draped in the usual sterile fashion.     After Esmarch exsanguination, the pneumotourniquet was inflated to 250 mm of mercury.   A 2 cm longitudinal incision was fashioned at the base of the palm, paralleling the longitudinal thenar crease. Dissection was carried carefully through the subcutaneous tissue identifying and protecting the neurovascular structures. The palmar fascia was incised longitudinally, exposing the transverse carpal ligament. The transverse carpal ligament was incised from its proximal to distal most extent, under direct visualization. The terminal 2 cm of antebrachial fascia was similarly incised

## 2024-07-30 NOTE — ANESTHESIA POSTPROCEDURE EVALUATION
Department of Anesthesiology  Postprocedure Note    Patient: Brandi Casey  MRN: [id number]  YOB: 1980  Date of evaluation: 7/30/2024    Procedure Summary       Date: 07/30/24 Room / Location: [address]    Anesthesia Start: 0813 Anesthesia Stop: 0827    Procedure: RIGHT CARPAL TUNNEL RELEASE (Right: Wrist) Diagnosis:       Right carpal tunnel syndrome      (Right carpal tunnel syndrome [G56.01])    Surgeons: Joel Bynum MD Responsible Provider: Justino Painter DO    Anesthesia Type: general ASA Status: 2            Anesthesia Type: No value filed.    Judy Phase I: Judy Score: 10    Judy Phase II: Judy Score: 9    Anesthesia Post Evaluation    Patient location during evaluation: PACU  Patient participation: complete - patient participated  Level of consciousness: awake  Pain score: 0  Airway patency: patent  Nausea & Vomiting: no nausea and no vomiting  Cardiovascular status: blood pressure returned to baseline and hemodynamically stable  Respiratory status: acceptable and room air  Hydration status: stable  Comments: AWAKE, AWARE, ORIENTED  ANSWERED ALL QUESTIONS  PAIN AND VOLUME STATUS STABLE  VITAL SIGNS STABLE  Pain management: adequate    No notable events documented.

## 2024-08-05 ENCOUNTER — OFFICE VISIT (OUTPATIENT)
Dept: ORTHOPEDIC SURGERY | Age: 44
End: 2024-08-05

## 2024-08-05 VITALS — RESPIRATION RATE: 16 BRPM | HEIGHT: 64 IN | WEIGHT: 160 LBS | BODY MASS INDEX: 27.31 KG/M2

## 2024-08-05 DIAGNOSIS — Z98.890 STATUS POST CARPAL TUNNEL RELEASE: Primary | ICD-10-CM

## 2024-08-05 PROCEDURE — 99024 POSTOP FOLLOW-UP VISIT: CPT | Performed by: ORTHOPAEDIC SURGERY

## 2024-08-05 NOTE — PROGRESS NOTES
Ms. Brandi Casey returns today in follow-up of her recent right Carpal Tunnel Release done approximately 6 days ago.  She has done well noting mild discomfort and no other reported complications.    She notes pre-operative symptoms to be completely resolved at this time.    Physical Exam:  Bandage changed prior to visit   Skin incision is healing well, without erythema, drainage or sign of infection.  Digital range of motion is without significant limitation.  Wrist range of motion is without significant limitation.  Sensation is improved from preoperatvely  Vascular examination reveals normal, good capillary refill, and good color.  Swelling is minimal.  Sensory and Motor Median Nerve function is intact.    Impression:  Ms. Brandi Casey is doing well after recent right Carpal Tunnel Release.    Plan:  Ms. Brandi Casey is instructed in work on Active & Passive range of motion of the digits, wrist, & elbow.  These modalities were specifically demonstrated to her today.  We discussed the appropriateness of gradual resumption of use of the operated hand and the return to normal use as comfort allows.  She is given instructions regarding management of the fresh surgical incision and progressive use of desensitization and tissue massage techniques.  We discussed the appropriate expectations and timeline for symptom improvement.    She is provided a written patient instruction sheet titled: Postoperative Instructions After Carpal Tunnel Release.    I have asked Ms. Brandi Casey to follow-up with me or contact me by telephone over the next 2-4 weeks if her symptoms have not fully resolved or if she has not regained full & painless return of function.      She is also specifically instructed to return to the office or call for an appointment sooner if her symptoms are changing or worsening prior to that time.

## 2024-08-30 NOTE — TELEPHONE ENCOUNTER
General Question     Subject: SX SCHEDULING   Patient and /or Facility Request: Brandi Casey   Contact Number: [phone]     PATIENT CALLING REQUESTING A CALL BACK FROM SOMEONE WANTING TO KNOW IF SHE CAN HAVE AN EARLIER TIME FOR HER SCHEDULED SURGERY ON  7/25/24 THAT SAME DAY OR A DIFFERENT DAY THAT SAME WEEK     PLEASE CALL THE PATIENT BACK AT THE ABOVE NUMBER   
Spoke with patient, surgery rescheduled   
98.1

## 2024-09-30 ENCOUNTER — TELEPHONE (OUTPATIENT)
Dept: ENDOCRINOLOGY | Age: 44
End: 2024-09-30

## 2024-09-30 DIAGNOSIS — E03.8 CENTRAL HYPOTHYROIDISM: ICD-10-CM

## 2024-09-30 RX ORDER — LEVOTHYROXINE SODIUM 75 UG/1
TABLET ORAL
Qty: 30 TABLET | Refills: 0 | OUTPATIENT
Start: 2024-09-30

## 2024-09-30 NOTE — TELEPHONE ENCOUNTER
Patient called requesting a refill     Rx- lisinopril (PRINIVIL;ZESTRIL) 2.5 MG tablet    Pharmacy- [facility]/pharmacy #[id number]    LOV-  NOV- 4/17/25    Please advise

## 2024-09-30 NOTE — TELEPHONE ENCOUNTER
DEVAN explaining High blood medication is being managed by her PCP per last office note. Advised her to contact her PCP.

## 2024-09-30 NOTE — TELEPHONE ENCOUNTER
Pt called back stating that [facility] needs authorization from Dr. Magallanes to refill the script.  Please contact the pharmacy and advise what needs to be done.       [id number] - [place], [place] - [address] - P [phone] - F [phone]  [address] [facility] [address]  Phone: [phone]  Fax: [phone]

## 2024-10-03 ENCOUNTER — TELEPHONE (OUTPATIENT)
Dept: ENDOCRINOLOGY | Age: 44
End: 2024-10-03

## 2024-10-03 NOTE — TELEPHONE ENCOUNTER
Mrs. Casey informed there was 90 day supply with 3 additional refills sent to her pharmacy 7/17/2024. She stated the pharmacy stated they gave her 90 day supply in July but she has none left. I explained she had the 3 additional refills to contact her pharmacy and see if they can get it ready for . I also advised her to  speak with the pharmacy manager if she feels she did not receive the 90 day supply.

## 2024-10-03 NOTE — TELEPHONE ENCOUNTER
Pt called in and needs her liothyronine refilled.  The pharmacy states they gave her a 90 day supply, but she has been out of them for the last three or four days. Can you please reach out to the pharmacy and advise and or send a new script over. Nasrin.     [facility]/pharmacy #[id number] - [place], [place] - [address] - P [phone] - F [phone]  [address] [facility] [address]  Phone: [phone]  Fax: [phone]

## 2025-02-13 DIAGNOSIS — E03.8 CENTRAL HYPOTHYROIDISM: ICD-10-CM

## 2025-02-13 RX ORDER — LEVOTHYROXINE SODIUM 75 UG/1
TABLET ORAL
Qty: 30 TABLET | Refills: 0 | OUTPATIENT
Start: 2025-02-13

## 2025-03-03 ENCOUNTER — OFFICE VISIT (OUTPATIENT)
Age: 45
End: 2025-03-03

## 2025-03-03 VITALS
DIASTOLIC BLOOD PRESSURE: 80 MMHG | HEART RATE: 96 BPM | TEMPERATURE: 98.3 F | WEIGHT: 162 LBS | RESPIRATION RATE: 16 BRPM | HEIGHT: 64 IN | SYSTOLIC BLOOD PRESSURE: 110 MMHG | OXYGEN SATURATION: 97 % | BODY MASS INDEX: 27.66 KG/M2

## 2025-03-03 DIAGNOSIS — J20.9 ACUTE BRONCHITIS, UNSPECIFIED ORGANISM: Primary | ICD-10-CM

## 2025-03-03 RX ORDER — ROSUVASTATIN CALCIUM 5 MG/1
1 TABLET, COATED ORAL DAILY
COMMUNITY
Start: 2025-02-03 | End: 2025-03-05

## 2025-03-03 RX ORDER — VENLAFAXINE HYDROCHLORIDE 37.5 MG/1
CAPSULE, EXTENDED RELEASE ORAL
COMMUNITY
Start: 2025-02-03

## 2025-03-03 RX ORDER — MOXIFLOXACIN HYDROCHLORIDE 400 MG/1
400 TABLET ORAL DAILY
Qty: 7 TABLET | Refills: 0 | Status: SHIPPED | OUTPATIENT
Start: 2025-03-03 | End: 2025-03-10

## 2025-03-03 RX ORDER — DEXTROMETHORPHAN HYDROBROMIDE AND PROMETHAZINE HYDROCHLORIDE 15; 6.25 MG/5ML; MG/5ML
5 SYRUP ORAL 4 TIMES DAILY PRN
Qty: 180 ML | Refills: 0 | Status: SHIPPED | OUTPATIENT
Start: 2025-03-03

## 2025-03-03 RX ORDER — ALBUTEROL SULFATE 90 UG/1
2 INHALANT RESPIRATORY (INHALATION) 4 TIMES DAILY PRN
Qty: 18 G | Refills: 0 | Status: SHIPPED | OUTPATIENT
Start: 2025-03-03

## 2025-03-03 RX ORDER — PREDNISONE 20 MG/1
20 TABLET ORAL 2 TIMES DAILY
Qty: 10 TABLET | Refills: 0 | Status: SHIPPED | OUTPATIENT
Start: 2025-03-03 | End: 2025-03-08

## 2025-03-03 ASSESSMENT — ENCOUNTER SYMPTOMS
WHEEZING: 1
COUGH: 1
VOMITING: 0
ABDOMINAL PAIN: 0
DIARRHEA: 0
RHINORRHEA: 0
SORE THROAT: 0
SHORTNESS OF BREATH: 0

## 2025-03-03 NOTE — PROGRESS NOTES
Skin:  Negative for rash.   Neurological:  Negative for headaches.       Physical Exam  Constitutional:       General: She is not in acute distress.  HENT:      Nose: Congestion present.      Mouth/Throat:      Mouth: Mucous membranes are moist.      Pharynx: No oropharyngeal exudate or posterior oropharyngeal erythema.   Eyes:      Conjunctiva/sclera: Conjunctivae normal.      Pupils: Pupils are equal, round, and reactive to light.   Cardiovascular:      Rate and Rhythm: Normal rate and regular rhythm.   Pulmonary:      Effort: Pulmonary effort is normal. No respiratory distress.      Breath sounds: Rales (few crackles,resolved with cough) present.   Musculoskeletal:      Cervical back: Neck supple. No rigidity.      Right lower leg: No edema.      Left lower leg: No edema.   Lymphadenopathy:      Cervical: No cervical adenopathy.   Skin:     Findings: No rash.   Neurological:      General: No focal deficit present.      Mental Status: She is alert and oriented to person, place, and time.           An electronic signature was used to authenticate this note.    --EMILE MCLAIN MD

## 2025-03-25 DIAGNOSIS — J20.9 ACUTE BRONCHITIS, UNSPECIFIED ORGANISM: ICD-10-CM

## 2025-03-27 ENCOUNTER — TELEPHONE (OUTPATIENT)
Dept: ENDOCRINOLOGY | Age: 45
End: 2025-03-27

## 2025-03-27 NOTE — TELEPHONE ENCOUNTER
Pt needs future labs faxed to [facility] please  Fax: [phone]

## 2025-03-28 RX ORDER — ALBUTEROL SULFATE 90 UG/1
2 INHALANT RESPIRATORY (INHALATION) 4 TIMES DAILY PRN
Qty: 18 EACH | OUTPATIENT
Start: 2025-03-28

## 2025-04-12 LAB
PROLACTIN: 14.2 NG/ML (ref 3.3–26.7)
T3 FREE: 3.1 PG/ML (ref 2.5–3.9)
T4 FREE: 0.64 NG/DL (ref 0.61–1.12)
TSH SERPL DL<=0.05 MIU/L-ACNC: 0.06 UIU/ML (ref 0.45–5.33)

## 2025-04-13 ENCOUNTER — RESULTS FOLLOW-UP (OUTPATIENT)
Dept: ENDOCRINOLOGY | Age: 45
End: 2025-04-13

## 2025-04-13 LAB — T3 TOTAL: 108 NG/DL (ref 71–180)

## 2025-04-17 ENCOUNTER — OFFICE VISIT (OUTPATIENT)
Dept: ENDOCRINOLOGY | Age: 45
End: 2025-04-17
Payer: MEDICAID

## 2025-04-17 VITALS
HEIGHT: 64 IN | HEART RATE: 104 BPM | DIASTOLIC BLOOD PRESSURE: 92 MMHG | BODY MASS INDEX: 28 KG/M2 | OXYGEN SATURATION: 99 % | WEIGHT: 164 LBS | SYSTOLIC BLOOD PRESSURE: 110 MMHG

## 2025-04-17 DIAGNOSIS — E03.8 CENTRAL HYPOTHYROIDISM: Primary | ICD-10-CM

## 2025-04-17 DIAGNOSIS — I10 ESSENTIAL HYPERTENSION: ICD-10-CM

## 2025-04-17 DIAGNOSIS — E22.1 HYPERPROLACTINEMIA: ICD-10-CM

## 2025-04-17 PROCEDURE — 99214 OFFICE O/P EST MOD 30 MIN: CPT | Performed by: INTERNAL MEDICINE

## 2025-04-17 PROCEDURE — 3074F SYST BP LT 130 MM HG: CPT | Performed by: INTERNAL MEDICINE

## 2025-04-17 PROCEDURE — 3080F DIAST BP >= 90 MM HG: CPT | Performed by: INTERNAL MEDICINE

## 2025-04-17 RX ORDER — FERROUS SULFATE 325(65) MG
1 TABLET ORAL DAILY
COMMUNITY

## 2025-04-17 RX ORDER — LEVOTHYROXINE SODIUM 100 UG/1
TABLET ORAL
Qty: 90 TABLET | Refills: 1 | Status: SHIPPED | OUTPATIENT
Start: 2025-04-17

## 2025-04-17 NOTE — PROGRESS NOTES
3.5 - 5.7 g/dL Final    Phosphorus 12/09/2024 4.3  2.5 - 5.0 mg/dL Final    eGFR Female 12/09/2024 >90  >90 mL/min/1.73m2 Final   Admission on 07/30/2024, Discharged on 07/30/2024   Component Date Value Ref Range Status    Pregnancy, Urine 07/30/2024 Negative  Detects HCG level >20 MIU/mL Final   Orders Only on 07/11/2024   Component Date Value Ref Range Status    TSH 07/11/2024 0.014 (A)  0.450 - 5.330 uIU/mL Final    T3, Free 07/11/2024 3.3  2.5 - 3.9 pg/mL Final    T4 Free 07/11/2024 0.85  0.61 - 1.12 ng/dL Final    Prolactin 07/11/2024 11.8  3.3 - 26.7 ng/mL Final    T3, Total 07/11/2024 111  71 - 180 ng/dL Final   Orders Only on 07/11/2024   Component Date Value Ref Range Status    Phosphorus 07/11/2024 3.7  2.5 - 5.0 mg/dL Final    Sodium 07/11/2024 138  136 - 145 mmol/L Final    Potassium 07/11/2024 3.8  3.5 - 5.1 mmol/L Final    Chloride 07/11/2024 102  98 - 107 mmol/L Final    CO2 07/11/2024 27  21 - 31 mmol/L Final    Anion Gap 07/11/2024 9  7 - 16 mmol/L Final    Glucose 07/11/2024 79  74 - 109 mg/dL Final    BUN 07/11/2024 13  7 - 25 mg/dL Final    Creatinine 07/11/2024 0.64  0.6 - 1.2 mg/dL Final    Calcium 07/11/2024 9.4  8.6 - 10.2 mg/dL Final    eGFR Female 07/11/2024 >90  >90 mL/min/1.73m2 Final    Cholesterol, Total 07/11/2024 184  <200 mg/dL Final    Triglycerides 07/11/2024 89  <150 mg/dL Final    HDL 07/11/2024 56  40 - 60 mg/dL Final    LDL Cholesterol 07/11/2024 110 (A)  0 - 99 mg/dL Final    VLDL 07/11/2024 18  0 - 40 mg/dl Final    Iron 07/11/2024 68  50 - 170 ug/dL Final    Iron % Saturation 07/11/2024 20  20 - 40 % Final    UIBC 07/11/2024 275  155 - 355 ug/dL Final    TIBC 07/11/2024 343  250 - 450 ug/dL Final    Total Protein 07/11/2024 6.8  6.0 - 8.3 g/dL Final    Albumin 07/11/2024 3.7  3.5 - 5.7 g/dL Final    Alkaline Phosphatase 07/11/2024 49  34 - 104 U/L Final    AST 07/11/2024 14  13 - 39 U/L Final    ALT 07/11/2024 10  7 - 52 U/L Final    Total Bilirubin 07/11/2024 0.7  0.3 - 1.0

## 2025-08-25 ENCOUNTER — OFFICE VISIT (OUTPATIENT)
Age: 45
End: 2025-08-25

## 2025-08-25 VITALS
TEMPERATURE: 97.7 F | HEIGHT: 64 IN | BODY MASS INDEX: 24.24 KG/M2 | OXYGEN SATURATION: 96 % | SYSTOLIC BLOOD PRESSURE: 99 MMHG | WEIGHT: 142 LBS | RESPIRATION RATE: 16 BRPM | HEART RATE: 84 BPM | DIASTOLIC BLOOD PRESSURE: 76 MMHG

## 2025-08-25 DIAGNOSIS — J06.9 VIRAL URI WITH COUGH: Primary | ICD-10-CM

## 2025-08-25 RX ORDER — PREDNISONE 20 MG/1
20 TABLET ORAL 2 TIMES DAILY
Qty: 10 TABLET | Refills: 0 | Status: SHIPPED | OUTPATIENT
Start: 2025-08-25 | End: 2025-08-30

## 2025-08-25 ASSESSMENT — ENCOUNTER SYMPTOMS: COUGH: 1

## 2025-08-28 DIAGNOSIS — E03.8 CENTRAL HYPOTHYROIDISM: ICD-10-CM

## 2025-08-28 RX ORDER — LIOTHYRONINE SODIUM 5 UG/1
5 TABLET ORAL DAILY
Qty: 90 TABLET | Refills: 0 | Status: SHIPPED | OUTPATIENT
Start: 2025-08-28

## 2025-08-30 ENCOUNTER — OFFICE VISIT (OUTPATIENT)
Age: 45
End: 2025-08-30

## 2025-08-30 VITALS
WEIGHT: 145 LBS | RESPIRATION RATE: 16 BRPM | SYSTOLIC BLOOD PRESSURE: 114 MMHG | DIASTOLIC BLOOD PRESSURE: 80 MMHG | OXYGEN SATURATION: 96 % | BODY MASS INDEX: 24.75 KG/M2 | HEART RATE: 73 BPM | HEIGHT: 64 IN | TEMPERATURE: 98.4 F

## 2025-08-30 DIAGNOSIS — J20.9 ACUTE BRONCHITIS, UNSPECIFIED ORGANISM: Primary | ICD-10-CM

## 2025-08-30 RX ORDER — DEXTROMETHORPHAN HYDROBROMIDE AND PROMETHAZINE HYDROCHLORIDE 15; 6.25 MG/5ML; MG/5ML
5 SYRUP ORAL 4 TIMES DAILY PRN
Qty: 180 ML | Refills: 0 | Status: SHIPPED | OUTPATIENT
Start: 2025-08-30

## 2025-08-30 RX ORDER — AZITHROMYCIN 250 MG/1
250 TABLET, FILM COATED ORAL SEE ADMIN INSTRUCTIONS
Qty: 6 TABLET | Refills: 0 | Status: SHIPPED | OUTPATIENT
Start: 2025-08-30 | End: 2025-09-04

## 2025-08-30 ASSESSMENT — ENCOUNTER SYMPTOMS
WHEEZING: 0
RHINORRHEA: 0
COUGH: 1
SORE THROAT: 0
SHORTNESS OF BREATH: 0

## (undated) DEVICE — NEEDLE HYPO 18GA L1.5IN PNK POLYPR HUB S STL REG BVL STR

## (undated) DEVICE — NEEDLE HYPO 25GA L1.5IN BLU POLYPR HUB S STL REG BVL STR

## (undated) DEVICE — BANDAGE COMPR W4INXL12FT E DISP ESMARCH EVEN

## (undated) DEVICE — UNDERPAD HOSP W30XL36IN WHT SUP ABSRB POLYMER AIR PERM DISP

## (undated) DEVICE — UNDERGLOVE SURG SZ 8 FNGR THK0.21MIL GRN LTX BEAD CUF

## (undated) DEVICE — GLOVE ORANGE PI 7   MSG9070

## (undated) DEVICE — SYRINGE MED 10ML LUERLOCK TIP W/O SFTY DISP

## (undated) DEVICE — Z INACTIVE USE 2635508 SOLUTION IRRIG 500ML 0.9% SOD CHL USP POUR PLAS BTL

## (undated) DEVICE — WRAP COHESIVE W2INXL5YD TAN SELF ADH BNDG HND NON STERILE TEAR CARING

## (undated) DEVICE — TOWEL,OR,DSP,ST,BLUE,STD,4/PK,20PK/CS: Brand: MEDLINE

## (undated) DEVICE — SOLUTION IRRIG 500ML 0.9% SOD CHLO USP POUR PLAS BTL

## (undated) DEVICE — WILLIS PACK: Brand: MEDLINE INDUSTRIES, INC.